# Patient Record
Sex: MALE | Race: BLACK OR AFRICAN AMERICAN | Employment: OTHER | URBAN - METROPOLITAN AREA
[De-identification: names, ages, dates, MRNs, and addresses within clinical notes are randomized per-mention and may not be internally consistent; named-entity substitution may affect disease eponyms.]

---

## 2021-04-30 ENCOUNTER — HOSPITAL ENCOUNTER (OUTPATIENT)
Age: 81
Discharge: HOME | End: 2021-04-30
Attending: INTERNAL MEDICINE | Admitting: INTERNAL MEDICINE
Payer: MEDICAID

## 2021-04-30 VITALS
HEIGHT: 72 IN | OXYGEN SATURATION: 96 % | BODY MASS INDEX: 25.73 KG/M2 | TEMPERATURE: 98.1 F | RESPIRATION RATE: 18 BRPM | SYSTOLIC BLOOD PRESSURE: 120 MMHG | WEIGHT: 190 LBS | HEART RATE: 78 BPM | DIASTOLIC BLOOD PRESSURE: 69 MMHG

## 2021-04-30 DIAGNOSIS — R94.39 ABNORMAL STRESS TEST: ICD-10-CM

## 2021-04-30 PROBLEM — R07.9 CHEST PAIN: Status: ACTIVE | Noted: 2021-04-30

## 2021-04-30 LAB
ALBUMIN SERPL-MCNC: 3.2 G/DL (ref 3.5–5)
ALBUMIN/GLOB SERPL: 0.8 {RATIO} (ref 1.1–2.2)
ALP SERPL-CCNC: 100 U/L (ref 45–117)
ALT SERPL-CCNC: 15 U/L (ref 12–78)
ANION GAP SERPL CALC-SCNC: 4 MMOL/L (ref 5–15)
APTT PPP: 36.7 SEC (ref 21.2–34.1)
AST SERPL W P-5'-P-CCNC: 10 U/L (ref 15–37)
ATRIAL RATE: 81 BPM
BILIRUB SERPL-MCNC: 1.2 MG/DL (ref 0.2–1)
BUN SERPL-MCNC: 15 MG/DL (ref 6–20)
BUN/CREAT SERPL: 17 (ref 12–20)
CA-I BLD-MCNC: 8.5 MG/DL (ref 8.5–10.1)
CALCULATED P AXIS, ECG09: 76 DEGREES
CALCULATED R AXIS, ECG10: 30 DEGREES
CALCULATED T AXIS, ECG11: 47 DEGREES
CHLORIDE SERPL-SCNC: 110 MMOL/L (ref 97–108)
CO2 SERPL-SCNC: 24 MMOL/L (ref 21–32)
CREAT SERPL-MCNC: 0.88 MG/DL (ref 0.7–1.3)
DIAGNOSIS, 93000: NORMAL
ERYTHROCYTE [DISTWIDTH] IN BLOOD BY AUTOMATED COUNT: 13.4 % (ref 11.5–14.5)
GLOBULIN SER CALC-MCNC: 4.2 G/DL (ref 2–4)
GLUCOSE SERPL-MCNC: 89 MG/DL (ref 65–100)
HCT VFR BLD AUTO: 39.5 % (ref 36.6–50.3)
HGB BLD-MCNC: 12.8 G/DL (ref 12.1–17)
INR PPP: 1.1 (ref 0.9–1.1)
MCH RBC QN AUTO: 30.6 PG (ref 26–34)
MCHC RBC AUTO-ENTMCNC: 32.4 G/DL (ref 30–36.5)
MCV RBC AUTO: 94.5 FL (ref 80–99)
P-R INTERVAL, ECG05: 184 MS
PLATELET # BLD AUTO: 252 K/UL (ref 150–400)
PMV BLD AUTO: 10.8 FL (ref 8.9–12.9)
POTASSIUM SERPL-SCNC: 3.9 MMOL/L (ref 3.5–5.1)
PROT SERPL-MCNC: 7.4 G/DL (ref 6.4–8.2)
PROTHROMBIN TIME: 13.9 SEC (ref 11.9–14.7)
Q-T INTERVAL, ECG07: 378 MS
QRS DURATION, ECG06: 82 MS
QTC CALCULATION (BEZET), ECG08: 439 MS
RBC # BLD AUTO: 4.18 M/UL (ref 4.1–5.7)
SODIUM SERPL-SCNC: 138 MMOL/L (ref 136–145)
THERAPEUTIC RANGE,PTTT: ABNORMAL SEC (ref 82–109)
VENTRICULAR RATE, ECG03: 81 BPM
WBC # BLD AUTO: 7.6 K/UL (ref 4.1–11.1)

## 2021-04-30 PROCEDURE — 74011250636 HC RX REV CODE- 250/636: Performed by: INTERNAL MEDICINE

## 2021-04-30 PROCEDURE — 85027 COMPLETE CBC AUTOMATED: CPT

## 2021-04-30 PROCEDURE — 93010 ELECTROCARDIOGRAM REPORT: CPT | Performed by: INTERNAL MEDICINE

## 2021-04-30 PROCEDURE — 74011000250 HC RX REV CODE- 250: Performed by: INTERNAL MEDICINE

## 2021-04-30 PROCEDURE — 85610 PROTHROMBIN TIME: CPT

## 2021-04-30 PROCEDURE — 77030016699 HC CATH ANGI DX INFN1 CARD -A: Performed by: INTERNAL MEDICINE

## 2021-04-30 PROCEDURE — 85730 THROMBOPLASTIN TIME PARTIAL: CPT

## 2021-04-30 PROCEDURE — 93005 ELECTROCARDIOGRAM TRACING: CPT

## 2021-04-30 PROCEDURE — 2709999900 HC NON-CHARGEABLE SUPPLY: Performed by: INTERNAL MEDICINE

## 2021-04-30 PROCEDURE — C1894 INTRO/SHEATH, NON-LASER: HCPCS | Performed by: INTERNAL MEDICINE

## 2021-04-30 PROCEDURE — 36415 COLL VENOUS BLD VENIPUNCTURE: CPT

## 2021-04-30 PROCEDURE — C1769 GUIDE WIRE: HCPCS | Performed by: INTERNAL MEDICINE

## 2021-04-30 PROCEDURE — 99152 MOD SED SAME PHYS/QHP 5/>YRS: CPT | Performed by: INTERNAL MEDICINE

## 2021-04-30 PROCEDURE — 77030029065 HC DRSG HEMO QCLOT ZMED -B: Performed by: INTERNAL MEDICINE

## 2021-04-30 PROCEDURE — 74011000636 HC RX REV CODE- 636: Performed by: INTERNAL MEDICINE

## 2021-04-30 PROCEDURE — 80053 COMPREHEN METABOLIC PANEL: CPT

## 2021-04-30 PROCEDURE — 77030003394 HC NDL ART COOK -A: Performed by: INTERNAL MEDICINE

## 2021-04-30 PROCEDURE — 76210000021 HC REC RM PH II 0.5 TO 1 HR: Performed by: INTERNAL MEDICINE

## 2021-04-30 PROCEDURE — 77030019698 HC SYR ANGI MDLON MRTM -A: Performed by: INTERNAL MEDICINE

## 2021-04-30 PROCEDURE — 77030042317 HC BND COMPR HEMSTAT -B: Performed by: INTERNAL MEDICINE

## 2021-04-30 PROCEDURE — 93458 L HRT ARTERY/VENTRICLE ANGIO: CPT | Performed by: INTERNAL MEDICINE

## 2021-04-30 PROCEDURE — 76210000016 HC OR PH I REC 1 TO 1.5 HR: Performed by: INTERNAL MEDICINE

## 2021-04-30 RX ORDER — NICARDIPINE HYDROCHLORIDE 2.5 MG/ML
INJECTION INTRAVENOUS AS NEEDED
Status: DISCONTINUED | OUTPATIENT
Start: 2021-04-30 | End: 2021-04-30 | Stop reason: HOSPADM

## 2021-04-30 RX ORDER — HEPARIN SODIUM 200 [USP'U]/100ML
INJECTION, SOLUTION INTRAVENOUS
Status: COMPLETED | OUTPATIENT
Start: 2021-04-30 | End: 2021-04-30

## 2021-04-30 RX ORDER — CETIRIZINE HCL 10 MG
TABLET ORAL
COMMUNITY

## 2021-04-30 RX ORDER — LIDOCAINE HYDROCHLORIDE 10 MG/ML
INJECTION INFILTRATION; PERINEURAL AS NEEDED
Status: DISCONTINUED | OUTPATIENT
Start: 2021-04-30 | End: 2021-04-30 | Stop reason: HOSPADM

## 2021-04-30 RX ORDER — SODIUM CHLORIDE 9 MG/ML
30 INJECTION, SOLUTION INTRAVENOUS CONTINUOUS
Status: DISCONTINUED | OUTPATIENT
Start: 2021-04-30 | End: 2021-04-30 | Stop reason: HOSPADM

## 2021-04-30 RX ORDER — SODIUM CHLORIDE 0.9 % (FLUSH) 0.9 %
5-40 SYRINGE (ML) INJECTION AS NEEDED
Status: DISCONTINUED | OUTPATIENT
Start: 2021-04-30 | End: 2021-04-30 | Stop reason: HOSPADM

## 2021-04-30 RX ORDER — ISOSORBIDE MONONITRATE 30 MG/1
30 TABLET, EXTENDED RELEASE ORAL DAILY
COMMUNITY

## 2021-04-30 RX ORDER — SODIUM CHLORIDE 0.9 % (FLUSH) 0.9 %
5-40 SYRINGE (ML) INJECTION EVERY 8 HOURS
Status: DISCONTINUED | OUTPATIENT
Start: 2021-04-30 | End: 2021-04-30 | Stop reason: HOSPADM

## 2021-04-30 RX ORDER — HYDROCODONE BITARTRATE AND ACETAMINOPHEN 5; 325 MG/1; MG/1
1 TABLET ORAL
Status: DISCONTINUED | OUTPATIENT
Start: 2021-04-30 | End: 2021-04-30 | Stop reason: HOSPADM

## 2021-04-30 RX ORDER — GABAPENTIN 600 MG/1
600 TABLET ORAL DAILY
COMMUNITY

## 2021-04-30 RX ORDER — ACETAMINOPHEN 325 MG/1
650 TABLET ORAL
Status: DISCONTINUED | OUTPATIENT
Start: 2021-04-30 | End: 2021-04-30 | Stop reason: HOSPADM

## 2021-04-30 RX ORDER — FENTANYL CITRATE 50 UG/ML
INJECTION, SOLUTION INTRAMUSCULAR; INTRAVENOUS AS NEEDED
Status: DISCONTINUED | OUTPATIENT
Start: 2021-04-30 | End: 2021-04-30 | Stop reason: HOSPADM

## 2021-04-30 RX ORDER — PANTOPRAZOLE SODIUM 40 MG/1
40 TABLET, DELAYED RELEASE ORAL DAILY
COMMUNITY

## 2021-04-30 RX ORDER — MIDAZOLAM HYDROCHLORIDE 1 MG/ML
INJECTION INTRAMUSCULAR; INTRAVENOUS AS NEEDED
Status: DISCONTINUED | OUTPATIENT
Start: 2021-04-30 | End: 2021-04-30 | Stop reason: HOSPADM

## 2021-04-30 RX ORDER — HEPARIN SODIUM 1000 [USP'U]/ML
INJECTION, SOLUTION INTRAVENOUS; SUBCUTANEOUS AS NEEDED
Status: DISCONTINUED | OUTPATIENT
Start: 2021-04-30 | End: 2021-04-30 | Stop reason: HOSPADM

## 2021-04-30 RX ORDER — TAMSULOSIN HYDROCHLORIDE 0.4 MG/1
0.4 CAPSULE ORAL DAILY
COMMUNITY

## 2021-04-30 RX ORDER — TRAMADOL HYDROCHLORIDE 50 MG/1
50 TABLET ORAL
COMMUNITY

## 2021-04-30 RX ORDER — LISINOPRIL 10 MG/1
10 TABLET ORAL DAILY
COMMUNITY

## 2021-04-30 RX ADMIN — SODIUM CHLORIDE 30 ML/HR: 9 INJECTION, SOLUTION INTRAVENOUS at 13:15

## 2021-04-30 NOTE — Clinical Note
TRANSFER - OUT REPORT:     Verbal report given to: [place]. Report consisted of patient's Situation, Background, Assessment and   Recommendations(SBAR). Opportunity for questions and clarification was provided. Patient transported with a Registered Nurse. Patient transported to: PACU.

## 2021-04-30 NOTE — PROGRESS NOTES
Pt did well post cardiac cath, dressing rt radial remained dry/intact, radial pulse 2+, pt able to move fingers, no swelling. Family member at bedside, d/c instructions given to pt and family, voices understanding.  IV d/c'd, pt home with family.

## 2021-04-30 NOTE — Clinical Note
Contrast Dose Calculator:   Patient's age: [de-identified].   Patient's sex: Male. Patient weight (kg) = 86.2. Creatinine level (mg/dL) = 0.88. Creatinine clearance (mL/min): 81.63. Contrast concentration (mg/mL) = 370. MACD = 300 mL. Max Contrast dose per Creatinine Cl calculator = 183.66 mL.

## 2021-04-30 NOTE — DISCHARGE INSTRUCTIONS
RADIAL CATHETERIZATION AND INTERVENTIONAL DISCHARGE INSTRUCTIONS       MEDICATIONS:  Take only medications ordered by your provider. ACTIVITIES:  While the wound is healing; bleeding or swelling can occur as a result of stress or strain. Carefully follow these guidelines:    · DO NOT DRIVE for 24 hours after the procedure or as instructed by your provider. · You may shower 24 hours after your procedure. No soaking the wrist for 3 days (i.e., bath tub, swimming, washing dishes). · You may return to work in (***) days. · It is essential that you DO NOT SMOKE. · Do not bend your wrist for 24 hours. · Do not lift more than 3-5 pounds with affected wrist for 1 week. SITE CARE:  · Use a clean Band-Aid® for 48 hours. · Keep site clean and dry. · Avoid lotions, ointments or powders at the wound site for I week. · Check the procedural site for any signs of infection (redness, drainage, swelling). · You may notice a small, hard lump or small bruise at the puncture site. This is normal and will clear in about 2 weeks. · If the lump increases in size; apply firm, direct pressure over the site. Notify your physician. · If there is a small amount of bleeding at the site; apply firm, direct continuous pressure over the site with your thumb against the puncture site and your finger against the back of the wrist for 10 minutes. Your nurse will review this with you. Notify your physician. · If bleeding does not stop after 10 minutes or if there is a large amount of bleeding, call for an ambulance immediately. Continue to hold pressure until help arrives. WHEN TO CALL YOUR DOCTOR:  · Angina - if your angina symptoms return; use your nitroglycerin as instructed by your provider. If you do not have nitroglycerin or if your symptoms do not completely resolve after 10 -15 minutes, call an ambulance.  Do not drive yourself to the hospital.  · Warmth, redness, drainage and/or pain at the procedural site or temperature over 101°F.  · Persistent tenderness or pain at procedural site. · Swelling, coolness, numbness and /or tingling of the fingers, hand, wrist or arm. · Lightheadedness, dizziness, fainting or rapid heart beating. · If you have any other questions or concerns, or you are experiencing a problem. FOLLOW-UP CARE:  · Follow up with your provider and /or cardiologist as recommended. · If you had a Stent implanted, carry your Stent ID card with you at all times. · Never stop taking your prescribed Brilinta (Ticagrelor),Plavix (Clopidogrel), Aspirin, Coumadin (Warfarin) or Effient (Prasugrel) without speaking with your cardiologist.     I understand and can verbalize these instructions, and have participated in the development of this discharge plan. I have read and received a copy of this plan.

## 2022-03-07 ENCOUNTER — TRANSCRIBE ORDER (OUTPATIENT)
Dept: SCHEDULING | Age: 82
End: 2022-03-07

## 2022-03-07 DIAGNOSIS — R42 DIZZINESS: Primary | ICD-10-CM

## 2022-03-15 ENCOUNTER — HOSPITAL ENCOUNTER (OUTPATIENT)
Dept: CT IMAGING | Age: 82
Discharge: HOME | End: 2022-03-15
Attending: NURSE PRACTITIONER
Payer: MEDICAID

## 2022-03-15 DIAGNOSIS — R42 DIZZINESS: ICD-10-CM

## 2022-03-15 PROCEDURE — 70450 CT HEAD/BRAIN W/O DYE: CPT

## 2022-03-19 PROBLEM — R07.9 CHEST PAIN: Status: ACTIVE | Noted: 2021-04-30

## 2022-04-19 ENCOUNTER — APPOINTMENT (OUTPATIENT)
Dept: GENERAL RADIOLOGY | Age: 82
End: 2022-04-19
Attending: EMERGENCY MEDICINE
Payer: MEDICAID

## 2022-04-19 ENCOUNTER — HOSPITAL ENCOUNTER (OUTPATIENT)
Age: 82
Setting detail: OBSERVATION
Discharge: HOME | End: 2022-04-20
Attending: EMERGENCY MEDICINE | Admitting: HOSPITALIST
Payer: MEDICAID

## 2022-04-19 ENCOUNTER — APPOINTMENT (OUTPATIENT)
Dept: CT IMAGING | Age: 82
End: 2022-04-19
Attending: HOSPITALIST
Payer: MEDICAID

## 2022-04-19 DIAGNOSIS — R07.9 ACUTE CHEST PAIN: Primary | ICD-10-CM

## 2022-04-19 PROBLEM — K21.9 GERD (GASTROESOPHAGEAL REFLUX DISEASE): Status: ACTIVE | Noted: 2022-04-19

## 2022-04-19 PROBLEM — I25.10 CAD (CORONARY ARTERY DISEASE): Status: ACTIVE | Noted: 2022-04-19

## 2022-04-19 PROBLEM — R79.89 POSITIVE D DIMER: Status: ACTIVE | Noted: 2022-04-19

## 2022-04-19 PROBLEM — I10 HTN (HYPERTENSION): Status: ACTIVE | Noted: 2022-04-19

## 2022-04-19 LAB
ALBUMIN SERPL-MCNC: 3.1 G/DL (ref 3.5–5)
ALBUMIN/GLOB SERPL: 0.8 {RATIO} (ref 1.1–2.2)
ALP SERPL-CCNC: 95 U/L (ref 45–117)
ALT SERPL-CCNC: 14 U/L (ref 12–78)
ANION GAP SERPL CALC-SCNC: 8 MMOL/L (ref 5–15)
AST SERPL W P-5'-P-CCNC: 12 U/L (ref 15–37)
BASOPHILS # BLD: 0.1 K/UL (ref 0–0.2)
BASOPHILS NFR BLD: 1 % (ref 0–2.5)
BILIRUB SERPL-MCNC: 0.3 MG/DL (ref 0.2–1)
BNP SERPL-MCNC: 53 PG/ML
BUN SERPL-MCNC: 6 MG/DL (ref 6–20)
BUN/CREAT SERPL: 6 (ref 12–20)
CA-I BLD-MCNC: 8 MG/DL (ref 8.5–10.1)
CHLORIDE SERPL-SCNC: 107 MMOL/L (ref 97–108)
CHOLEST SERPL-MCNC: 140 MG/DL
CO2 SERPL-SCNC: 28 MMOL/L (ref 21–32)
CREAT SERPL-MCNC: 1.05 MG/DL (ref 0.7–1.3)
D DIMER PPP FEU-MCNC: 1.31 UG/ML(FEU)
EOSINOPHIL # BLD: 0.2 K/UL (ref 0–0.7)
EOSINOPHIL NFR BLD: 4 % (ref 0.9–2.9)
ERYTHROCYTE [DISTWIDTH] IN BLOOD BY AUTOMATED COUNT: 13.8 % (ref 11.5–14.5)
GLOBULIN SER CALC-MCNC: 3.9 G/DL (ref 2–4)
GLUCOSE SERPL-MCNC: 89 MG/DL (ref 65–100)
HCT VFR BLD AUTO: 38.2 % (ref 41–53)
HDLC SERPL-MCNC: 41 MG/DL
HDLC SERPL: 3.4 {RATIO} (ref 0–5)
HGB BLD-MCNC: 12.6 G/DL (ref 13.5–17.5)
INR PPP: 1.1 (ref 0.9–1.1)
LDLC SERPL CALC-MCNC: 56.8 MG/DL (ref 0–100)
LIPID PROFILE,FLP: ABNORMAL
LYMPHOCYTES # BLD: 1.8 K/UL (ref 1–4.8)
LYMPHOCYTES NFR BLD: 29 % (ref 20.5–51.1)
MAGNESIUM SERPL-MCNC: 1.9 MG/DL (ref 1.6–2.4)
MCH RBC QN AUTO: 31 PG (ref 31–34)
MCHC RBC AUTO-ENTMCNC: 33 G/DL (ref 31–36)
MCV RBC AUTO: 93.8 FL (ref 80–100)
MONOCYTES # BLD: 0.4 K/UL (ref 0.2–2.4)
MONOCYTES NFR BLD: 6 % (ref 1.7–9.3)
NEUTS SEG # BLD: 3.8 K/UL (ref 1.8–7.7)
NEUTS SEG NFR BLD: 60 % (ref 42–75)
NRBC # BLD: 0.01 K/UL
NRBC BLD-RTO: 0.1 PER 100 WBC
PLATELET # BLD AUTO: 276 K/UL (ref 150–400)
PMV BLD AUTO: 8.5 FL (ref 6.5–11.5)
POTASSIUM SERPL-SCNC: 4.2 MMOL/L (ref 3.5–5.1)
PROT SERPL-MCNC: 7 G/DL (ref 6.4–8.2)
PROTHROMBIN TIME: 14 SEC (ref 11.9–14.6)
RBC # BLD AUTO: 4.07 M/UL (ref 4.5–5.9)
SODIUM SERPL-SCNC: 143 MMOL/L (ref 136–145)
TRIGL SERPL-MCNC: 211 MG/DL (ref ?–150)
TROPONIN-HIGH SENSITIVITY: 6 NG/L (ref 0–76)
TROPONIN-HIGH SENSITIVITY: 6 NG/L (ref 0–76)
TROPONIN-HIGH SENSITIVITY: 7 NG/L (ref 0–76)
TSH SERPL DL<=0.05 MIU/L-ACNC: 1.07 UIU/ML (ref 0.36–3.74)
VLDLC SERPL CALC-MCNC: 42.2 MG/DL
WBC # BLD AUTO: 6.3 K/UL (ref 4.4–11.3)

## 2022-04-19 PROCEDURE — G0378 HOSPITAL OBSERVATION PER HR: HCPCS

## 2022-04-19 PROCEDURE — 71045 X-RAY EXAM CHEST 1 VIEW: CPT

## 2022-04-19 PROCEDURE — 83880 ASSAY OF NATRIURETIC PEPTIDE: CPT

## 2022-04-19 PROCEDURE — 74011000250 HC RX REV CODE- 250: Performed by: HOSPITALIST

## 2022-04-19 PROCEDURE — 71275 CT ANGIOGRAPHY CHEST: CPT

## 2022-04-19 PROCEDURE — 74011250637 HC RX REV CODE- 250/637: Performed by: HOSPITALIST

## 2022-04-19 PROCEDURE — 36415 COLL VENOUS BLD VENIPUNCTURE: CPT

## 2022-04-19 PROCEDURE — 74011000636 HC RX REV CODE- 636: Performed by: HOSPITALIST

## 2022-04-19 PROCEDURE — 83735 ASSAY OF MAGNESIUM: CPT

## 2022-04-19 PROCEDURE — 93005 ELECTROCARDIOGRAM TRACING: CPT

## 2022-04-19 PROCEDURE — 80053 COMPREHEN METABOLIC PANEL: CPT

## 2022-04-19 PROCEDURE — 74011250637 HC RX REV CODE- 250/637: Performed by: EMERGENCY MEDICINE

## 2022-04-19 PROCEDURE — 85025 COMPLETE CBC W/AUTO DIFF WBC: CPT

## 2022-04-19 PROCEDURE — 85379 FIBRIN DEGRADATION QUANT: CPT

## 2022-04-19 PROCEDURE — 99285 EMERGENCY DEPT VISIT HI MDM: CPT

## 2022-04-19 PROCEDURE — 84443 ASSAY THYROID STIM HORMONE: CPT

## 2022-04-19 PROCEDURE — 80061 LIPID PANEL: CPT

## 2022-04-19 PROCEDURE — 85610 PROTHROMBIN TIME: CPT

## 2022-04-19 PROCEDURE — 84484 ASSAY OF TROPONIN QUANT: CPT

## 2022-04-19 RX ORDER — ONDANSETRON 2 MG/ML
4 INJECTION INTRAMUSCULAR; INTRAVENOUS
Status: DISCONTINUED | OUTPATIENT
Start: 2022-04-19 | End: 2022-04-20 | Stop reason: HOSPADM

## 2022-04-19 RX ORDER — ACETAMINOPHEN 325 MG/1
650 TABLET ORAL
Status: DISCONTINUED | OUTPATIENT
Start: 2022-04-19 | End: 2022-04-20 | Stop reason: HOSPADM

## 2022-04-19 RX ORDER — SODIUM CHLORIDE 0.9 % (FLUSH) 0.9 %
5-40 SYRINGE (ML) INJECTION EVERY 8 HOURS
Status: DISCONTINUED | OUTPATIENT
Start: 2022-04-19 | End: 2022-04-20 | Stop reason: HOSPADM

## 2022-04-19 RX ORDER — PANTOPRAZOLE SODIUM 40 MG/1
40 TABLET, DELAYED RELEASE ORAL
Status: DISCONTINUED | OUTPATIENT
Start: 2022-04-20 | End: 2022-04-20 | Stop reason: HOSPADM

## 2022-04-19 RX ORDER — CETIRIZINE HCL 10 MG
10 TABLET ORAL EVERY EVENING
Status: DISCONTINUED | OUTPATIENT
Start: 2022-04-19 | End: 2022-04-20 | Stop reason: HOSPADM

## 2022-04-19 RX ORDER — ISOSORBIDE MONONITRATE 30 MG/1
30 TABLET, EXTENDED RELEASE ORAL DAILY
Status: DISCONTINUED | OUTPATIENT
Start: 2022-04-20 | End: 2022-04-20 | Stop reason: HOSPADM

## 2022-04-19 RX ORDER — SODIUM CHLORIDE 0.9 % (FLUSH) 0.9 %
5-40 SYRINGE (ML) INJECTION AS NEEDED
Status: DISCONTINUED | OUTPATIENT
Start: 2022-04-19 | End: 2022-04-20 | Stop reason: HOSPADM

## 2022-04-19 RX ORDER — LISINOPRIL 10 MG/1
10 TABLET ORAL DAILY
Status: DISCONTINUED | OUTPATIENT
Start: 2022-04-20 | End: 2022-04-20 | Stop reason: HOSPADM

## 2022-04-19 RX ORDER — ENOXAPARIN SODIUM 100 MG/ML
40 INJECTION SUBCUTANEOUS DAILY
Status: DISCONTINUED | OUTPATIENT
Start: 2022-04-20 | End: 2022-04-20 | Stop reason: HOSPADM

## 2022-04-19 RX ORDER — ACETAMINOPHEN 650 MG/1
650 SUPPOSITORY RECTAL
Status: DISCONTINUED | OUTPATIENT
Start: 2022-04-19 | End: 2022-04-20 | Stop reason: HOSPADM

## 2022-04-19 RX ORDER — GUAIFENESIN 100 MG/5ML
324 LIQUID (ML) ORAL
Status: COMPLETED | OUTPATIENT
Start: 2022-04-19 | End: 2022-04-19

## 2022-04-19 RX ORDER — POLYETHYLENE GLYCOL 3350 17 G/17G
17 POWDER, FOR SOLUTION ORAL DAILY PRN
Status: DISCONTINUED | OUTPATIENT
Start: 2022-04-19 | End: 2022-04-20 | Stop reason: HOSPADM

## 2022-04-19 RX ORDER — TAMSULOSIN HYDROCHLORIDE 0.4 MG/1
0.4 CAPSULE ORAL DAILY
Status: DISCONTINUED | OUTPATIENT
Start: 2022-04-20 | End: 2022-04-20 | Stop reason: HOSPADM

## 2022-04-19 RX ORDER — TRAMADOL HYDROCHLORIDE 50 MG/1
50 TABLET ORAL
Status: DISCONTINUED | OUTPATIENT
Start: 2022-04-19 | End: 2022-04-20 | Stop reason: HOSPADM

## 2022-04-19 RX ADMIN — SODIUM CHLORIDE, PRESERVATIVE FREE 10 ML: 5 INJECTION INTRAVENOUS at 16:13

## 2022-04-19 RX ADMIN — CETIRIZINE HYDROCHLORIDE 10 MG: 10 TABLET, FILM COATED ORAL at 18:32

## 2022-04-19 RX ADMIN — IOPAMIDOL 100 ML: 755 INJECTION, SOLUTION INTRAVENOUS at 16:07

## 2022-04-19 RX ADMIN — ASPIRIN 81 MG 324 MG: 81 TABLET ORAL at 11:35

## 2022-04-19 RX ADMIN — SODIUM CHLORIDE, PRESERVATIVE FREE 10 ML: 5 INJECTION INTRAVENOUS at 21:43

## 2022-04-19 NOTE — ROUTINE PROCESS
Patient admitted to PCU room #214, A&O x3, [place] bilaterally, NSR on telemetry, RA and tolerating well, no c/o CP since admission, cane at bedside, VSS.

## 2022-04-19 NOTE — ED NOTES
TRANSFER - OUT REPORT:    Verbal report given to [facility] RN on [place]  being transferred to [facility] for routine progression of care       Report consisted of patients Situation, Background, Assessment and   Recommendations(SBAR). Information from the following report(s) SBAR, ED Summary, MAR, Recent Results and Cardiac Rhythm NSR was reviewed with the receiving nurse. Lines:   Peripheral IV 04/19/22 Left Antecubital (Active)   Site Assessment Clean, dry, & intact 04/19/22 1155   Phlebitis Assessment 0 04/19/22 1155   Infiltration Assessment 0 04/19/22 1155   Dressing Status Clean, dry, & intact 04/19/22 1155   Dressing Type Tape;Transparent 04/19/22 1155   Hub Color/Line Status Pink;Flushed;Patent 04/19/22 1155        Opportunity for questions and clarification was provided.       Patient transported with:   Registered Nurse

## 2022-04-19 NOTE — ED PROVIDER NOTES
EMERGENCY DEPARTMENT HISTORY AND PHYSICAL EXAM      Date: 4/19/2022  Patient Name: [facility]      History of Presenting Illness     Chief Complaint   Patient presents with    Chest Pain       History Provided By: Patient    HPI: [facility], 81 y.o. male with a past medical history significant hypertension and stroke presents to the ED with cc of chest pain started yesterday with associated shortness of breath     There are no other complaints, changes, or physical findings at this time. PCP: Alexy Humphries MD    Current Outpatient Medications   Medication Sig Dispense Refill    gabapentin (NEURONTIN) 600 mg tablet Take 600 mg by mouth daily.  lisinopriL (PRINIVIL, ZESTRIL) 10 mg tablet Take 10 mg by mouth daily.  tamsulosin (FLOMAX) 0.4 mg capsule Take 0.4 mg by mouth daily.  traMADoL (ULTRAM) 50 mg tablet Take 50 mg by mouth every six (6) hours as needed for Pain.  pantoprazole (PROTONIX) 40 mg tablet Take 40 mg by mouth daily.  cetirizine (ZYRTEC) 10 mg tablet Take  by mouth nightly.  isosorbide mononitrate ER (IMDUR) 30 mg tablet Take 30 mg by mouth daily. Past History     Past Medical History:  Past Medical History:   Diagnosis Date    GERD (gastroesophageal reflux disease)     Hypertension     Stroke [facility])        Past Surgical History:  No past surgical history on file. Family History:  Family History   Family history unknown: Yes       Social History:  Social History     Tobacco Use    Smoking status: Never Smoker    Smokeless tobacco: Never Used   Substance Use Topics    Alcohol use: Never    Drug use: Never       Allergies:  No Known Allergies      Review of Systems     Review of Systems   Constitutional: Negative for chills and fever. HENT: Negative for rhinorrhea and sore throat. Eyes: Negative for pain and visual disturbance. Respiratory: Positive for shortness of breath. Negative for cough.     Cardiovascular: Positive for chest pain. Negative for leg swelling. Gastrointestinal: Negative for abdominal pain and vomiting. Endocrine: Negative for polydipsia and polyuria. Genitourinary: Negative for dysuria and hematuria. Musculoskeletal: Negative for back pain and neck pain. Skin: Negative for color change and pallor. Neurological: Negative for weakness and headaches. Psychiatric/Behavioral: Negative for agitation and suicidal ideas. Physical Exam     Physical Exam  Vitals and nursing note reviewed. Constitutional:       General: He is not in acute distress. Appearance: He is not ill-appearing, toxic-appearing or diaphoretic. HENT:      Head: Normocephalic and atraumatic. Right Ear: Tympanic membrane normal.      Left Ear: Tympanic membrane normal.      Nose: Nose normal. No congestion. Mouth/Throat:      Mouth: Mucous membranes are moist.      Pharynx: Oropharynx is clear. Eyes:      Extraocular Movements: Extraocular movements intact. Conjunctiva/sclera: Conjunctivae normal.      Pupils: Pupils are equal, round, and reactive to light. Cardiovascular:      Rate and Rhythm: Normal rate and regular rhythm. Pulses: Normal pulses. Heart sounds: Normal heart sounds. Pulmonary:      Effort: Pulmonary effort is normal.      Breath sounds: Normal breath sounds. Abdominal:      General: Bowel sounds are normal.      Palpations: Abdomen is soft. Tenderness: There is no abdominal tenderness. Musculoskeletal:         General: No tenderness, deformity or signs of injury. Normal range of motion. Cervical back: Normal range of motion and neck supple. No rigidity or tenderness. Lymphadenopathy:      Cervical: No cervical adenopathy. Skin:     General: Skin is warm and dry. Capillary Refill: Capillary refill takes less than 2 seconds. Findings: No rash. Neurological:      General: No focal deficit present.       Mental Status: He is alert and oriented to person, place, and time.      Cranial Nerves: No cranial nerve deficit. Sensory: No sensory deficit. Psychiatric:         Mood and Affect: Mood normal.         Behavior: Behavior normal.         Lab and Diagnostic Study Results     Labs -     Recent Results (from the past 12 hour(s))   EKG, 12 LEAD, INITIAL    Collection Time: 04/19/22 11:04 AM   Result Value Ref Range    Ventricular Rate 74 BPM    Atrial Rate 74 BPM    P-R Interval 182 ms    QRS Duration 87 ms    Q-T Interval 387 ms    QTC Calculation (Bezet) 430 ms    Calculated P Axis 64 degrees    Calculated R Axis 39 degrees    Calculated T Axis 61 degrees    Diagnosis Sinus rhythm        Radiologic Studies -   [unfilled]  CT Results  (Last 48 hours)    None        CXR Results  (Last 48 hours)    None          Medical Decision Making and ED Course   - I am the first and primary provider for this patient AND AM THE PRIMARY PROVIDER OF RECORD. - I reviewed the vital signs, available nursing notes, past medical history, past surgical history, family history and social history. - Initial assessment performed. The patients presenting problems have been discussed, and the staff are in agreement with the care plan formulated and outlined with them. I have encouraged them to ask questions as they arise throughout their visit. Vital Signs-Reviewed the patient's vital signs. Patient Vitals for the past 12 hrs:   Temp Pulse Resp BP SpO2   04/19/22 1112     100 %   04/19/22 1109 98.4 °F (36.9 °C) 73 18 135/78 99 %       Records Reviewed: Nursing Notes    The patient presents with chest pain with a differential diagnosis of  ACS, pulmonary edema/CHF and pnuemonia    ED Course:       ED Course as of 04/19/22 1405   Tue Apr 19, 2022   1120 Patient pain-free now [SB]      ED Course User Index  [SB] Luther Gold MD         Provider Notes (Medical Decision Making):      MDM           Consultations:       Consultations: - NONE        Procedures and Critical Care       Performed by: Luana Ceballos MD  PROCEDURES:  Procedures               Luana Ceballos MD        Disposition     Disposition: Condition stable and improved  Admitted, patient stable, observation unit, case discussed with Dr. Alberto Garcia if not discharged  DISCHARGE PLAN:  1. Current Discharge Medication List      CONTINUE these medications which have NOT CHANGED    Details   gabapentin (NEURONTIN) 600 mg tablet Take 600 mg by mouth daily. lisinopriL (PRINIVIL, ZESTRIL) 10 mg tablet Take 10 mg by mouth daily. tamsulosin (FLOMAX) 0.4 mg capsule Take 0.4 mg by mouth daily. traMADoL (ULTRAM) 50 mg tablet Take 50 mg by mouth every six (6) hours as needed for Pain.      pantoprazole (PROTONIX) 40 mg tablet Take 40 mg by mouth daily. cetirizine (ZYRTEC) 10 mg tablet Take  by mouth nightly. isosorbide mononitrate ER (IMDUR) 30 mg tablet Take 30 mg by mouth daily. 2.   Follow-up Information    None       3. Return to ED if worse   4. Current Discharge Medication List          Diagnosis     Clinical Impression: No diagnosis found. Attestations:    Luana Ceballos MD    Please note that this dictation was completed with [employer], the computer voice recognition software. Quite often unanticipated grammatical, syntax, homophones, and other interpretive errors are inadvertently transcribed by the computer software. Please disregard these errors. Please excuse any errors that have escaped final proofreading. Thank you.

## 2022-04-19 NOTE — ED TRIAGE NOTES
Patient reports upper chest pain on both sides that started yesterday, also reports slight SOB as well. Denies hx of MI or any cardiac hx.

## 2022-04-19 NOTE — ED NOTES
Attempted to call report at this time, states nurse is in another room at this time but she would give me a call back shortly.

## 2022-04-19 NOTE — ED NOTES
Attempted to give report for a second time, states that nurse is not at the desk & they would get her to call the ER.

## 2022-04-20 ENCOUNTER — APPOINTMENT (OUTPATIENT)
Dept: VASCULAR SURGERY | Age: 82
End: 2022-04-20
Attending: NURSE PRACTITIONER
Payer: MEDICAID

## 2022-04-20 VITALS
DIASTOLIC BLOOD PRESSURE: 70 MMHG | BODY MASS INDEX: 26.61 KG/M2 | OXYGEN SATURATION: 94 % | SYSTOLIC BLOOD PRESSURE: 125 MMHG | TEMPERATURE: 98.2 F | WEIGHT: 196.5 LBS | RESPIRATION RATE: 18 BRPM | HEART RATE: 68 BPM | HEIGHT: 72 IN

## 2022-04-20 LAB
ANION GAP SERPL CALC-SCNC: 8 MMOL/L (ref 5–15)
ATRIAL RATE: 70 BPM
ATRIAL RATE: 74 BPM
BASOPHILS # BLD: 0 K/UL (ref 0–0.2)
BASOPHILS NFR BLD: 1 % (ref 0–2.5)
BUN SERPL-MCNC: 7 MG/DL (ref 6–20)
BUN/CREAT SERPL: 7 (ref 12–20)
CA-I BLD-MCNC: 7.9 MG/DL (ref 8.5–10.1)
CALCULATED P AXIS, ECG09: 62 DEGREES
CALCULATED P AXIS, ECG09: 64 DEGREES
CALCULATED R AXIS, ECG10: 25 DEGREES
CALCULATED R AXIS, ECG10: 39 DEGREES
CALCULATED T AXIS, ECG11: 37 DEGREES
CALCULATED T AXIS, ECG11: 61 DEGREES
CHLORIDE SERPL-SCNC: 106 MMOL/L (ref 97–108)
CO2 SERPL-SCNC: 26 MMOL/L (ref 21–32)
CREAT SERPL-MCNC: 1.05 MG/DL (ref 0.7–1.3)
DIAGNOSIS, 93000: NORMAL
DIAGNOSIS, 93000: NORMAL
ECHO AO ROOT DIAM: 4.2 CM
ECHO AO ROOT INDEX: 1.99 CM/M2
ECHO AV MEAN GRADIENT: 3 MMHG
ECHO AV PEAK VELOCITY: 1.2 M/S
ECHO AV VTI: 24.2 CM
ECHO LA VOL 2C: 21 ML (ref 18–58)
ECHO LA VOL 2C: 29 ML (ref 18–58)
ECHO LV EDV A2C: 33 ML
ECHO LV EDV A4C: 52 ML
ECHO LV EDV INDEX A4C: 25 ML/M2
ECHO LV EDV NDEX A2C: 16 ML/M2
ECHO LV EJECTION FRACTION BIPLANE: 70 % (ref 55–100)
ECHO LV ESV A2C: 12 ML
ECHO LV ESV A4C: 13 ML
ECHO LV ESV INDEX A2C: 6 ML/M2
ECHO LV ESV INDEX A4C: 6 ML/M2
ECHO LV FRACTIONAL SHORTENING: 33 % (ref 28–44)
ECHO LV INTERNAL DIMENSION DIASTOLE INDEX: 2.18 CM/M2
ECHO LV INTERNAL DIMENSION DIASTOLIC: 4.6 CM (ref 4.2–5.9)
ECHO LV INTERNAL DIMENSION SYSTOLIC INDEX: 1.47 CM/M2
ECHO LV INTERNAL DIMENSION SYSTOLIC: 3.1 CM
ECHO LV IVSD: 1 CM (ref 0.6–1)
ECHO LV MASS 2D: 169.9 G (ref 88–224)
ECHO LV MASS INDEX 2D: 80.5 G/M2 (ref 49–115)
ECHO LV POSTERIOR WALL DIASTOLIC: 1.1 CM (ref 0.6–1)
ECHO LV RELATIVE WALL THICKNESS RATIO: 0.48
ECHO LVOT AV VTI INDEX: 0.68
ECHO LVOT MEAN GRADIENT: 1 MMHG
ECHO LVOT PEAK GRADIENT: 4 MMHG
ECHO LVOT VTI: 16.4 CM
ECHO MV A VELOCITY: 0.85 M/S
ECHO MV AREA PHT: 3.1 CM2
ECHO MV E DECELERATION TIME (DT): 241.2 MS
ECHO MV E VELOCITY: 0.8 M/S
ECHO MV E/A RATIO: 0.94
ECHO MV PRESSURE HALF TIME (PHT): 70 MS
ECHO RV INTERNAL DIMENSION: 3 CM
ECHO RV TAPSE: 2.3 CM (ref 1.7–?)
ECHO TRICUSPID ANNULAR PEAK SYSTOLIC VELOCITY: 13 CM/S
EOSINOPHIL # BLD: 0.2 K/UL (ref 0–0.7)
EOSINOPHIL NFR BLD: 4 % (ref 0.9–2.9)
ERYTHROCYTE [DISTWIDTH] IN BLOOD BY AUTOMATED COUNT: 14 % (ref 11.5–14.5)
GLUCOSE SERPL-MCNC: 86 MG/DL (ref 65–100)
HCT VFR BLD AUTO: 37.5 % (ref 41–53)
HGB BLD-MCNC: 12.2 G/DL (ref 13.5–17.5)
LYMPHOCYTES # BLD: 1.7 K/UL (ref 1–4.8)
LYMPHOCYTES NFR BLD: 29 % (ref 20.5–51.1)
MAGNESIUM SERPL-MCNC: 1.9 MG/DL (ref 1.6–2.4)
MCH RBC QN AUTO: 30.4 PG (ref 31–34)
MCHC RBC AUTO-ENTMCNC: 32.5 G/DL (ref 31–36)
MCV RBC AUTO: 93.4 FL (ref 80–100)
MONOCYTES # BLD: 0.5 K/UL (ref 0.2–2.4)
MONOCYTES NFR BLD: 9 % (ref 1.7–9.3)
NEUTS SEG # BLD: 3.4 K/UL (ref 1.8–7.7)
NEUTS SEG NFR BLD: 57 % (ref 42–75)
NRBC # BLD: 0 K/UL
NRBC BLD-RTO: 0.1 PER 100 WBC
P-R INTERVAL, ECG05: 182 MS
P-R INTERVAL, ECG05: 193 MS
PLATELET # BLD AUTO: 258 K/UL (ref 150–400)
PMV BLD AUTO: 8.5 FL (ref 6.5–11.5)
POTASSIUM SERPL-SCNC: 4 MMOL/L (ref 3.5–5.1)
Q-T INTERVAL, ECG07: 387 MS
Q-T INTERVAL, ECG07: 411 MS
QRS DURATION, ECG06: 81 MS
QRS DURATION, ECG06: 87 MS
QTC CALCULATION (BEZET), ECG08: 430 MS
QTC CALCULATION (BEZET), ECG08: 444 MS
RBC # BLD AUTO: 4.01 M/UL (ref 4.5–5.9)
SODIUM SERPL-SCNC: 140 MMOL/L (ref 136–145)
VENTRICULAR RATE, ECG03: 70 BPM
VENTRICULAR RATE, ECG03: 74 BPM
WBC # BLD AUTO: 5.9 K/UL (ref 4.4–11.3)

## 2022-04-20 PROCEDURE — 36415 COLL VENOUS BLD VENIPUNCTURE: CPT

## 2022-04-20 PROCEDURE — 85025 COMPLETE CBC W/AUTO DIFF WBC: CPT

## 2022-04-20 PROCEDURE — 83735 ASSAY OF MAGNESIUM: CPT

## 2022-04-20 PROCEDURE — 74011250637 HC RX REV CODE- 250/637: Performed by: HOSPITALIST

## 2022-04-20 PROCEDURE — 80048 BASIC METABOLIC PNL TOTAL CA: CPT

## 2022-04-20 PROCEDURE — 96374 THER/PROPH/DIAG INJ IV PUSH: CPT

## 2022-04-20 PROCEDURE — 93306 TTE W/DOPPLER COMPLETE: CPT

## 2022-04-20 PROCEDURE — 74011250636 HC RX REV CODE- 250/636: Performed by: NURSE PRACTITIONER

## 2022-04-20 PROCEDURE — G0378 HOSPITAL OBSERVATION PER HR: HCPCS

## 2022-04-20 PROCEDURE — 74011000250 HC RX REV CODE- 250: Performed by: HOSPITALIST

## 2022-04-20 RX ORDER — MAGNESIUM SULFATE HEPTAHYDRATE 40 MG/ML
2 INJECTION, SOLUTION INTRAVENOUS ONCE
Status: COMPLETED | OUTPATIENT
Start: 2022-04-20 | End: 2022-04-20

## 2022-04-20 RX ADMIN — LISINOPRIL 10 MG: 10 TABLET ORAL at 10:32

## 2022-04-20 RX ADMIN — ISOSORBIDE MONONITRATE 30 MG: 30 TABLET, EXTENDED RELEASE ORAL at 10:32

## 2022-04-20 RX ADMIN — SODIUM CHLORIDE, PRESERVATIVE FREE 10 ML: 5 INJECTION INTRAVENOUS at 05:53

## 2022-04-20 RX ADMIN — TAMSULOSIN HYDROCHLORIDE 0.4 MG: 0.4 CAPSULE ORAL at 10:32

## 2022-04-20 RX ADMIN — PANTOPRAZOLE SODIUM 40 MG: 40 TABLET, DELAYED RELEASE ORAL at 10:32

## 2022-04-20 RX ADMIN — MAGNESIUM SULFATE HEPTAHYDRATE 2 G: 40 INJECTION, SOLUTION INTRAVENOUS at 10:32

## 2022-04-20 NOTE — CONSULTS
CONSULTATION    REASON FOR CONSULT:  Chest Pain    REQUESTING PROVIDER:  Dr. Franchesca Rios:    Chief Complaint   Patient presents with    Chest Pain         HISTORY OF PRESENT ILLNESS:  Rosangela Posey is a 81y.o. year-old male with past medical history significant for GERD, HTN, CVA, Nonobstructive CAD (Cath 4/30/21) last seen in clinic by [facility] 5/21/21 who presented to ED for evaluation of chest pain. He stated that 4/18 he was driving and had his discomfort in the lower part of his chest with no radiation or associated symptoms and spontaneously resolved after 15 minutes. Had 2 further similar episodes overnight/early 4/19 am, so he presented to ED for evaluation. Workup in ED revealed Dimer 1.31, but CTA negative for PE, BNP 53, HS Trop  6->7, TSH 1.07 and HR/BP/SPO2 were all WNL. He was referred for admission for further monitoring and trending given risk factors. He did report that over the past 1 has been frequently climbing a ladder onto a roof and doing some lifting to help his grandson. This am he has no complaints and reports that he is feeling well. No sx overnight either. Records from hospital admission course thus far reviewed. Telemetry Review: SR      PAST MEDICAL HISTORY:    Past Medical History:   Diagnosis Date    GERD (gastroesophageal reflux disease)     Hypertension     Stroke ([facility] [address]        PAST SURGICAL HISTORY: No past surgical history on file. ALLERGIES:  No Known Allergies    FAMILY HISTORY:    Family History   Family history unknown: Yes       SOCIAL HISTORY:    Social History     Tobacco Use    Smoking status: Never Smoker    Smokeless tobacco: Never Used   Substance Use Topics    Alcohol use: Never    Drug use: Never         HOME MEDICATIONS:    Prior to Admission Medications   Prescriptions Last Dose Informant Patient Reported? Taking?    cetirizine (ZYRTEC) 10 mg tablet   Yes No   Sig: Take  by mouth nightly.   gabapentin (NEURONTIN) 600 mg tablet   Yes No   Sig: Take 600 mg by mouth daily. isosorbide mononitrate ER (IMDUR) 30 mg tablet   Yes No   Sig: Take 30 mg by mouth daily. lisinopriL (PRINIVIL, ZESTRIL) 10 mg tablet   Yes No   Sig: Take 10 mg by mouth daily. pantoprazole (PROTONIX) 40 mg tablet   Yes No   Sig: Take 40 mg by mouth daily. tamsulosin (FLOMAX) 0.4 mg capsule   Yes No   Sig: Take 0.4 mg by mouth daily. traMADoL (ULTRAM) 50 mg tablet   Yes No   Sig: Take 50 mg by mouth every six (6) hours as needed for Pain. Facility-Administered Medications: None       REVIEW OF SYSTEMS:  Complete review of systems performed, pertinents noted above, all other systems are negative. Patient Vitals for the past 24 hrs:   Temp Pulse Resp BP SpO2   04/20/22 0744 98.2 °F (36.8 °C) 68 18 125/70 94 %   04/20/22 0400  71      04/20/22 0327 99.1 °F (37.3 °C) 68 18 110/66 95 %   04/20/22 0000  66      04/19/22 2316 98.4 °F (36.9 °C) 64 18 135/67 95 %   04/19/22 2000  65      04/19/22 1932 97.7 °F (36.5 °C) 67 18 131/76 95 %   04/19/22 1610 97.5 °F (36.4 °C) 64 18 (!) 149/85 95 %   04/19/22 1600  64      04/19/22 1500  72 16 (!) 147/74 98 %   04/19/22 1400  70 18 128/76 98 %   04/19/22 1300  69 16 125/77 98 %   04/19/22 1230  69 24 129/77 99 %   04/19/22 1200  70 16 128/73 99 %   04/19/22 1112     100 %   04/19/22 1109 98.4 °F (36.9 °C) 73 18 135/78 99 %       PHYSICAL EXAMINATION:    General: Well nourished male lying inb ed, NAD, A&O  HEENT: Normocephalic, PERRL, no drainage, extremely hard of hearing  Neck: Supple, Trachea midline, No JVD  RESP: CTA bilaterally. + Symmetrical chest movement. No SOB or distress. On RA  Cardiovascular: RRR no MRG  PVS: No rubor, cyanosis, no edema, Radial, DP, PT pulses equal bilaterally  ABD: obese, soft, NT, Normoactive BS  Derm: Warm/Dry/Intact with no lesions, normal turgor  Neuro: A&O PPTS, cranial nerves II- XII grossly intact via interaction with patient.  No focal deficits  PSYCH: No anxiety or agitation      Electrocardiogram performed earlier reviewed, it shows Sr, no acute ischemia    Recent labs results and imaging reviewed. Recent Results (from the past 24 hour(s))   EKG, 12 LEAD, INITIAL    Collection Time: 04/19/22 11:04 AM   Result Value Ref Range    Ventricular Rate 74 BPM    Atrial Rate 74 BPM    P-R Interval 182 ms    QRS Duration 87 ms    Q-T Interval 387 ms    QTC Calculation (Bezet) 430 ms    Calculated P Axis 64 degrees    Calculated R Axis 39 degrees    Calculated T Axis 61 degrees    Diagnosis       Sinus rhythm    Confirmed by Dash Segura M.D., Katy Rios ([id number]) on 4/20/2022 10:02:29 AM     TROPONIN-HIGH SENSITIVITY    Collection Time: 04/19/22 11:30 AM   Result Value Ref Range    Troponin-High Sensitivity 6 0 - 76 ng/L   CBC WITH AUTOMATED DIFF    Collection Time: 04/19/22 11:34 AM   Result Value Ref Range    WBC 6.3 4.4 - 11.3 K/uL    RBC 4.07 (L) 4.50 - 5.90 M/uL    HGB 12.6 (L) 13.5 - 17.5 g/dL    HCT 38.2 (L) 41 - 53 %    MCV 93.8 80 - 100 FL    MCH 31.0 31 - 34 PG    MCHC 33.0 31.0 - 36.0 g/dL    RDW 13.8 11.5 - 14.5 %    PLATELET 288 729 - 241 K/uL    MPV 8.5 6.5 - 11.5 FL    NRBC 0.1  WBC    ABSOLUTE NRBC 0.01 K/uL    NEUTROPHILS 60 42 - 75 %    LYMPHOCYTES 29 20.5 - 51.1 %    MONOCYTES 6 1.7 - 9.3 %    EOSINOPHILS 4 (H) 0.9 - 2.9 %    BASOPHILS 1 0.0 - 2.5 %    ABS. NEUTROPHILS 3.8 1.8 - 7.7 K/UL    ABS. LYMPHOCYTES 1.8 1.0 - 4.8 K/UL    ABS. MONOCYTES 0.4 0.2 - 2.4 K/UL    ABS. EOSINOPHILS 0.2 0.0 - 0.7 K/UL    ABS.  BASOPHILS 0.1 0.0 - 0.2 K/UL   PROTHROMBIN TIME + INR    Collection Time: 04/19/22 11:34 AM   Result Value Ref Range    Prothrombin time 14.0 11.9 - 14.6 sec    INR 1.1 0.9 - 1.1     METABOLIC PANEL, COMPREHENSIVE    Collection Time: 04/19/22 11:34 AM   Result Value Ref Range    Sodium 143 136 - 145 mmol/L    Potassium 4.2 3.5 - 5.1 mmol/L    Chloride 107 97 - 108 mmol/L    CO2 28 21 - 32 mmol/L    Anion gap 8 5 - 15 mmol/L Glucose 89 65 - 100 mg/dL    BUN 6 6 - 20 mg/dL    Creatinine 1.05 0.70 - 1.30 mg/dL    BUN/Creatinine ratio 6 (L) 12 - 20      GFR est AA >60 >60 ml/min/1.73m2    GFR est non-AA >60 >60 ml/min/1.73m2    Calcium 8.0 (L) 8.5 - 10.1 mg/dL    Bilirubin, total 0.3 0.2 - 1.0 mg/dL    AST (SGOT) 12 (L) 15 - 37 U/L    ALT (SGPT) 14 12 - 78 U/L    Alk.  phosphatase 95 45 - 117 U/L    Protein, total 7.0 6.4 - 8.2 g/dL    Albumin 3.1 (L) 3.5 - 5.0 g/dL    Globulin 3.9 2.0 - 4.0 g/dL    A-G Ratio 0.8 (L) 1.1 - 2.2     NT-PRO BNP    Collection Time: 04/19/22 11:34 AM   Result Value Ref Range    NT pro-BNP 53 <450 pg/mL   MAGNESIUM    Collection Time: 04/19/22 11:34 AM   Result Value Ref Range    Magnesium 1.9 1.6 - 2.4 mg/dL   D DIMER    Collection Time: 04/19/22 11:34 AM   Result Value Ref Range    D DIMER 1.31 (H) <0.50 ug/ml(FEU)   TSH 3RD GENERATION    Collection Time: 04/19/22 11:34 AM   Result Value Ref Range    TSH 1.07 0.36 - 3.74 uIU/mL   LIPID PANEL    Collection Time: 04/19/22 11:34 AM   Result Value Ref Range    LIPID PROFILE        Cholesterol, total 140 <200 mg/dL    Triglyceride 211 (H) <150 mg/dL    HDL Cholesterol 41 mg/dL    LDL, calculated 56.8 0 - 100 mg/dL    VLDL, calculated 42.2 mg/dL    CHOL/HDL Ratio 3.4 0.0 - 5.0     EKG, 12 LEAD, SUBSEQUENT    Collection Time: 04/19/22 12:19 PM   Result Value Ref Range    Ventricular Rate 70 BPM    Atrial Rate 70 BPM    P-R Interval 193 ms    QRS Duration 81 ms    Q-T Interval 411 ms    QTC Calculation (Bezet) 444 ms    Calculated P Axis 62 degrees    Calculated R Axis 25 degrees    Calculated T Axis 37 degrees    Diagnosis       Sinus rhythm  Ventricular premature complex  Abnormal R-wave progression, early transition    Confirmed by Mleanie Woodard M.D., Ranulfo Devine ([id number]) on 4/20/2022 10:00:15 AM     TROPONIN-HIGH SENSITIVITY    Collection Time: 04/19/22  1:15 PM   Result Value Ref Range    Troponin-High Sensitivity 7 0 - 76 ng/L   TROPONIN-HIGH SENSITIVITY    Collection Time: 04/19/22  5:24 PM   Result Value Ref Range    Troponin-High Sensitivity 6 0 - 76 ng/L   METABOLIC PANEL, BASIC    Collection Time: 04/20/22  5:28 AM   Result Value Ref Range    Sodium 140 136 - 145 mmol/L    Potassium 4.0 3.5 - 5.1 mmol/L    Chloride 106 97 - 108 mmol/L    CO2 26 21 - 32 mmol/L    Anion gap 8 5 - 15 mmol/L    Glucose 86 65 - 100 mg/dL    BUN 7 6 - 20 mg/dL    Creatinine 1.05 0.70 - 1.30 mg/dL    BUN/Creatinine ratio 7 (L) 12 - 20      GFR est AA >60 >60 ml/min/1.73m2    GFR est non-AA >60 >60 ml/min/1.73m2    Calcium 7.9 (L) 8.5 - 10.1 mg/dL   MAGNESIUM    Collection Time: 04/20/22  5:28 AM   Result Value Ref Range    Magnesium 1.9 1.6 - 2.4 mg/dL   CBC WITH AUTOMATED DIFF    Collection Time: 04/20/22  5:28 AM   Result Value Ref Range    WBC 5.9 4.4 - 11.3 K/uL    RBC 4.01 (L) 4.50 - 5.90 M/uL    HGB 12.2 (L) 13.5 - 17.5 g/dL    HCT 37.5 (L) 41 - 53 %    MCV 93.4 80 - 100 FL    MCH 30.4 (L) 31 - 34 PG    MCHC 32.5 31.0 - 36.0 g/dL    RDW 14.0 11.5 - 14.5 %    PLATELET 595 911 - 393 K/uL    MPV 8.5 6.5 - 11.5 FL    NRBC 0.1  WBC    ABSOLUTE NRBC 0.00 K/uL    NEUTROPHILS 57 42 - 75 %    LYMPHOCYTES 29 20.5 - 51.1 %    MONOCYTES 9 1.7 - 9.3 %    EOSINOPHILS 4 (H) 0.9 - 2.9 %    BASOPHILS 1 0.0 - 2.5 %    ABS. NEUTROPHILS 3.4 1.8 - 7.7 K/UL    ABS. LYMPHOCYTES 1.7 1.0 - 4.8 K/UL    ABS. MONOCYTES 0.5 0.2 - 2.4 K/UL    ABS. EOSINOPHILS 0.2 0.0 - 0.7 K/UL    ABS.  BASOPHILS 0.0 0.0 - 0.2 K/UL   ECHO ADULT COMPLETE    Collection Time: 04/20/22 10:23 AM   Result Value Ref Range    EF BP 70 55 - 100 %    Aortic Root 4.2 cm    Ao Root Index 1.99 cm/m2    TAPSE 2.3 1.7 cm    TAPSV 13 cm/s    IVSd 1.0 0.6 - 1.0 cm    LVIDd 4.6 4.2 - 5.9 cm    LVIDs 3.1 cm    LVPWd 1.1 (A) 0.6 - 1.0 cm    LV EDV A2C 33 mL    LV EDV A4C 52 mL    LV ESV A2C 12 mL    LV ESV A4C 13 mL    LVOT Peak Gradient 4 mmHg    LVOT Mean Gradient 1 mmHg    LVOT VTI 16.4 cm    RVIDd 3.0 cm    LA Volume 2C 29 18 - 58 mL    LA Volume 2C 21 18 - 58 mL    AV Mean Gradient 3 mmHg    AV Peak Velocity 1.2 m/s    AV VTI 24.2 cm    MV A Velocity 0.85 m/s    MV E Wave Deceleration Time 241.2 ms    MV E Velocity 0.80 m/s    MV PHT 70.0 ms    Fractional Shortening 2D 33 28 - 44 %    LV ESV Index A4C 6 mL/m2    LV EDV Index A4C 25 mL/m2    LV ESV Index A2C 6 mL/m2    LV EDV Index A2C 16 mL/m2    LVIDd Index 2.18 cm/m2    LVIDs Index 1.47 cm/m2    LV RWT Ratio 0.48     LV Mass 2D 169.9 88 - 224 g    LV Mass 2D Index 80.5 49 - 115 g/m2    MV E/A 0.94     LVOT:AV VTI Index 0.68     MV Area by PHT 3.1 cm2       XR Results (maximum last 3): Results from [place] encounter on 04/19/22    XR CHEST PORT      CT Results (maximum last 3): Results from [place] encounter on 04/19/22    CTA CHEST W OR W WO CONT    Impression  No pulmonary embolism definitively identified. Other findings as  above. Results from [place] encounter on 03/15/22    CT HEAD WO CONT    Impression  Mild microvascular ischemic changes. No acute intracranial abnormality. MRI Results (maximum last 3): No results found for this or any previous visit. Nuclear Medicine Results (maximum last 3): No results found for this or any previous visit. US Results (maximum last 3): No results found for this or any previous visit. VAS/US Results (maximum last 3): No results found for this or any previous visit.         Current Facility-Administered Medications:     magnesium sulfate 2 g/50 ml IVPB (premix or compounded), 2 g, IntraVENous, ONCE, Russell Garza NP, Last Rate: 25 mL/hr at 04/20/22 1032, 2 g at 04/20/22 1032    sodium chloride (NS) flush 5-40 mL, 5-40 mL, IntraVENous, Q8H, Adebayo Mcmahon MD, 10 mL at 04/20/22 0553    sodium chloride (NS) flush 5-40 mL, 5-40 mL, IntraVENous, PRN, Ridge Mcmahon MD    acetaminophen (TYLENOL) tablet 650 mg, 650 mg, Oral, Q6H PRN **OR** acetaminophen (TYLENOL) suppository 650 mg, 650 mg, Rectal, Q6H PRN, Adebayo Mcmahon MD    polyethylene glycol (MIRALAX) packet 17 g, 17 g, Oral, DAILY PRN, Adebayo Mcmahon MD    enoxaparin (LOVENOX) injection 40 mg, 40 mg, SubCUTAneous, DAILY, Adebayo Mcmahon MD    ondansetron (ZOFRAN) injection 4 mg, 4 mg, IntraVENous, Q6H PRN, Adebayo Mcmahon MD    cetirizine (ZYRTEC) tablet 10 mg, 10 mg, Oral, QPM, Adebayo Mcmahon MD, 10 mg at 04/19/22 1832    lisinopriL (PRINIVIL, ZESTRIL) tablet 10 mg, 10 mg, Oral, DAILY, Adebayo Mcmahon MD, 10 mg at 04/20/22 1032    pantoprazole (PROTONIX) tablet 40 mg, 40 mg, Oral, ACB, Adebayo Mcmahon MD, 40 mg at 04/20/22 1032    tamsulosin (FLOMAX) capsule 0.4 mg, 0.4 mg, Oral, DAILY, Adebayo Mcmahon MD, 0.4 mg at 04/20/22 1032    traMADoL (ULTRAM) tablet 50 mg, 50 mg, Oral, Q6H PRN, Adebayo Mcmahon MD    isosorbide mononitrate ER (IMDUR) tablet 30 mg, 30 mg, Oral, DAILY, Adebayo Mcmahon MD, 30 mg at 04/20/22 1032          Case discussed with collaborating physician Dr. Jaiden Red and our impression and recommendations are as follows:   1. Chest Pain:  ACS ruled out with negative EKG and normal HS Trop trends of 6-7-6. TTE pending for this am.  Patient had Cardiac Catheterization 4/30/21 @ [facility] that showed mLAD lesion, but otherwise minimal luminal irregularities throughout. Pain likely MSK in nature. Continue risk factor modification and cardioprotective medications. Can follow-up in OP setting in 1 week for further optimization of medications. TSH 1.07. LDL 56, Triglycerides 211. Consider nutritionist consult to discuss dietary adjustments. 2. Hypomagnesemia:  Repletion ordered. Goal >2. K WNL. 3. Chronic Lung Disease:  CTA Chest showing emphysematic changes and atelectasis. Further per primary. 4. Thoracic Aorta Dilatation:  CTA Chest showed distal Thoracic arch dilated @ 3.1cm. TTE pending. No sx. Keep BP under control. Continue serial monitoring.    5. HTN: BP at goal. Continue current regimen. Thank you for involving us in the care of this patient. Please do not hesitate to call if additional questions arise.  If after hours please call [phone]

## 2022-04-20 NOTE — PROGRESS NOTES
Problem: Falls - Risk of  Goal: *Absence of Falls  Description: Document Megan Padron Fall Risk and appropriate interventions in the flowsheet.   Outcome: Resolved/Met  Note: Fall Risk Interventions:  Mobility Interventions: Patient to call before getting OOB,Utilize walker, cane, or other assistive device         Medication Interventions: Patient to call before getting OOB

## 2022-04-20 NOTE — DISCHARGE SUMMARY
Physician Discharge Summary       Patient: Stephanie Michele MRN: [id number]     YOB: 1940  Age: 81 y.o. Sex: male    PCP: Dagmar Neumann MD    Allergies: Patient has no known allergies. Admit date: 4/19/2022  Admitting Provider: Marshall Moreno MD    Discharge date: 4/20/2022  Discharging Provider: Marshall Moreno MD    * Admission Diagnoses:   Chest pain [R07.9]      * Discharge Diagnoses:    Hospital Problems as of 4/20/2022 Never Reviewed          Codes Class Noted - Resolved POA    Positive D dimer ICD-10-CM: R79.89  ICD-9-CM: 790.92  4/19/2022 - Present Unknown        HTN (hypertension) ICD-10-CM: I10  ICD-9-CM: 401.9  4/19/2022 - Present Unknown        CAD (coronary artery disease) ICD-10-CM: I25.10  ICD-9-CM: 414.00  4/19/2022 - Present Unknown        GERD (gastroesophageal reflux disease) ICD-10-CM: K21.9  ICD-9-CM: 530.81  4/19/2022 - Present Unknown        * (Principal) Chest pain ICD-10-CM: R07.9  ICD-9-CM: 786.50  4/30/2021 - Present Unknown                * Hospital Course: As per admitting provider on 4/19:  Stephanie Michele is a 81 y.o. male followed by Dagmar Neumann MD and [facility] cardiology  has a past medical history of GERD (gastroesophageal reflux disease), Hypertension, and Stroke [facility]). Presents from home with onset of central chest pain. He stated that yesterday he was driving and he had his discomfort in the lower part of his chest with no radiation and no associated symptoms and said it went away after about 15 minutes. He has over the past 1 week been up and down climbing a ladder onto a roof and doing some minimally heavy lifting to help his grandson. He notes that last night he had a sit up for a little while when symptoms recurred and then was able to go back to sleep but this morning patient felt similar symptoms again so came to the emergency room to get evaluated.   He states that the pain does not feel like his typical heartburn symptoms and there was note of shortness of breath but he says that he is always short of breath from time to time chronically and it was not associated with the pain he was having. On initial evaluation in the emergency room her blood pressure 135/78 with a heart rate of 73 afebrile and O2 sat 99% on room air. EKG showed sinus rhythm at a rate of 74 no ischemic changes noted initial troponins high-sensitivity x2 were both negative and BNP was only 53 and rest of his laboratory data was within his normal limits except for a D-dimer which was mildly elevated at 1.31 per patient not having noted any hypoxia or presented with tachycardia but with patient's history and sudden onset of symptoms will obtain CTA of the chest to further evaluate.   The patient was then referred for observation for chest pain rule out ACS    Chest pain   - hx of positive stress test but left heart cath done April 2021 by Dr Gerson Bray  shows nonobstructive disease with only 30% lesion in the LAD  - troponin x 3 are negative with normal BNP and negative chest x-ray and EKG lab showing any evidence of ischemic changes  -TSH is in normal range and lipid profile shows elevated triglyceride  -Cardiology consult appreciated and 2D echo was performed and prelim shows normal diastolic function EF about 70% and no evidence of fluid overload  -Patient stable for discharge home continue patient on his home medications and follow-up with cardiology in 2 weeks    Hypomagnesia   -Only slightly low at 1.9 and was given a IV magnesium sulfate rider prior to discharge    Elevated d dimer  - was 1.31   - presents with sudden chest pain and sob   -CTA of the chest was negative for PE but does show centrilobular emphysema and atelectatic changes no evidence of pneumonia or pleural effusion noted nodule in the right lower lobe posterior aspect measures 4 to 5 mm and has not significantly changed when compared to CT done on 12/12/2018     All other chronic medical problems stable and was continued on his medications with no changes       * Procedures:   * No surgery found *        Consults:   []Sindy for details             CONSULTATION     REASON FOR CONSULT:  Chest Pain     REQUESTING PROVIDER:  Dr. Jonas Polk:        Chief Complaint   Patient presents with    Chest Pain            HISTORY OF PRESENT ILLNESS:  Hermelindo Bess is a 81y.o. year-old male with past medical history significant for GERD, HTN, CVA, Nonobstructive CAD (Cath 4/30/21) last [facility] in clinic [facility]21[facility]21 who presented to ED for evaluation of chest pain. He stated that 4/18 he was driving and had his discomfort in the lower part of his chest with no radiation or associated symptoms and spontaneously resolved after 15 minutes.  Had 2 further similar episodes overnight/early 4/19 am, so he presented to ED for evaluation. Workup in ED revealed Dimer 1.31, but CTA negative for PE, BNP 53, HS Trop  6->7, TSH 1.07 and HR/BP/SPO2 were all WNL. He was referred for admission for further monitoring and trending given risk factors. He did report that over the past 1 has been frequently climbing a ladder onto a roof and doing some lifting to help his grandson. This am he has no complaints and reports that he is feeling well. No sx overnight either.       Records from hospital admission course thus far reviewed.       Telemetry Review: SR        PAST MEDICAL HISTORY:         Past Medical History:   Diagnosis Date    GERD (gastroesophageal reflux disease)      Hypertension      Stroke [facility])           PAST SURGICAL HISTORY: No past surgical history on file.     ALLERGIES:  No Known Allergies     FAMILY HISTORY:    Family History   Family history unknown:  Yes         SOCIAL HISTORY:    Social History           Tobacco Use    Smoking status: Never Smoker    Smokeless tobacco: Never Used   Substance Use Topics    Alcohol use: Never    Drug use: Never            HOME MEDICATIONS:    Prior to Admission Medications   Prescriptions Last Dose Informant Patient Reported? Taking? cetirizine (ZYRTEC) 10 mg tablet     Yes No   Sig: Take  by mouth nightly.   gabapentin (NEURONTIN) 600 mg tablet     Yes No   Sig: Take 600 mg by mouth daily. isosorbide mononitrate ER (IMDUR) 30 mg tablet     Yes No   Sig: Take 30 mg by mouth daily. lisinopriL (PRINIVIL, ZESTRIL) 10 mg tablet     Yes No   Sig: Take 10 mg by mouth daily. pantoprazole (PROTONIX) 40 mg tablet     Yes No   Sig: Take 40 mg by mouth daily. tamsulosin (FLOMAX) 0.4 mg capsule     Yes No   Sig: Take 0.4 mg by mouth daily. traMADoL (ULTRAM) 50 mg tablet     Yes No   Sig: Take 50 mg by mouth every six (6) hours as needed for Pain. Facility-Administered Medications: None        REVIEW OF SYSTEMS:  Complete review of systems performed, pertinents noted above, all other systems are negative.     Patient Vitals for the past 24 hrs:    Temp Pulse Resp BP SpO2   04/20/22 0744 98.2 °F (36.8 °C) 68 18 125/70 94 %   04/20/22 0400  71      04/20/22 0327 99.1 °F (37.3 °C) 68 18 110/66 95 %   04/20/22 0000  66      04/19/22 2316 98.4 °F (36.9 °C) 64 18 135/67 95 %   04/19/22 2000  65      04/19/22 1932 97.7 °F (36.5 °C) 67 18 131/76 95 %   04/19/22 1610 97.5 °F (36.4 °C) 64 18 (!) 149/85 95 %   04/19/22 1600  64      04/19/22 1500  72 16 (!) 147/74 98 %   04/19/22 1400  70 18 128/76 98 %   04/19/22 1300  69 16 125/77 98 %   04/19/22 1230  69 24 129/77 99 %   04/19/22 1200  70 16 128/73 99 %   04/19/22 1112     100 %   04/19/22 1109 98.4 °F (36.9 °C) 73 18 135/78 99 %         PHYSICAL EXAMINATION:    General: Well nourished male lying inb ed, NAD, A&O  HEENT: Normocephalic, PERRL, no drainage, extremely hard of hearing  Neck: Supple, Trachea midline, No JVD  RESP: CTA bilaterally. + Symmetrical chest movement. No SOB or distress.  On RA  Cardiovascular: RRR no MRG  PVS: No rubor, cyanosis, no edema, Radial, DP, PT pulses equal bilaterally  ABD: obese, soft, NT, Normoactive BS  Derm: Warm/Dry/Intact with no lesions, normal turgor  Neuro: A&O PPTS, cranial nerves II- XII grossly intact via interaction with patient. No focal deficits  PSYCH: No anxiety or agitation      Electrocardiogram performed earlier reviewed, it shows Sr, no acute ischemia     Recent labs results and imaging reviewed.       Recent Results          Recent Results (from the past 24 hour(s))   EKG, 12 LEAD, INITIAL     Collection Time: 04/19/22 11:04 AM   Result Value Ref Range     Ventricular Rate 74 BPM     Atrial Rate 74 BPM     P-R Interval 182 ms     QRS Duration 87 ms     Q-T Interval 387 ms     QTC Calculation (Bezet) 430 ms     Calculated P Axis 64 degrees     Calculated R Axis 39 degrees     Calculated T Axis 61 degrees     Diagnosis           Sinus rhythm     Confirmed by Jackelin Scott M.D., Esteban Johnson ([id number]) on 4/20/2022 10:02:29 AM      TROPONIN-HIGH SENSITIVITY     Collection Time: 04/19/22 11:30 AM   Result Value Ref Range     Troponin-High Sensitivity 6 0 - 76 ng/L   CBC WITH AUTOMATED DIFF     Collection Time: 04/19/22 11:34 AM   Result Value Ref Range     WBC 6.3 4.4 - 11.3 K/uL     RBC 4.07 (L) 4.50 - 5.90 M/uL     HGB 12.6 (L) 13.5 - 17.5 g/dL     HCT 38.2 (L) 41 - 53 %     MCV 93.8 80 - 100 FL     MCH 31.0 31 - 34 PG     MCHC 33.0 31.0 - 36.0 g/dL     RDW 13.8 11.5 - 14.5 %     PLATELET 287 503 - 755 K/uL     MPV 8.5 6.5 - 11.5 FL     NRBC 0.1  WBC     ABSOLUTE NRBC 0.01 K/uL     NEUTROPHILS 60 42 - 75 %     LYMPHOCYTES 29 20.5 - 51.1 %     MONOCYTES 6 1.7 - 9.3 %     EOSINOPHILS 4 (H) 0.9 - 2.9 %     BASOPHILS 1 0.0 - 2.5 %     ABS. NEUTROPHILS 3.8 1.8 - 7.7 K/UL     ABS. LYMPHOCYTES 1.8 1.0 - 4.8 K/UL     ABS. MONOCYTES 0.4 0.2 - 2.4 K/UL     ABS. EOSINOPHILS 0.2 0.0 - 0.7 K/UL     ABS.  BASOPHILS 0.1 0.0 - 0.2 K/UL   PROTHROMBIN TIME + INR     Collection Time: 04/19/22 11:34 AM   Result Value Ref Range     Prothrombin time 14.0 11.9 - 14.6 sec     INR 1.1 0.9 - 1.1     METABOLIC PANEL, COMPREHENSIVE     Collection Time: 04/19/22 11:34 AM   Result Value Ref Range     Sodium 143 136 - 145 mmol/L     Potassium 4.2 3.5 - 5.1 mmol/L     Chloride 107 97 - 108 mmol/L     CO2 28 21 - 32 mmol/L     Anion gap 8 5 - 15 mmol/L     Glucose 89 65 - 100 mg/dL     BUN 6 6 - 20 mg/dL     Creatinine 1.05 0.70 - 1.30 mg/dL     BUN/Creatinine ratio 6 (L) 12 - 20       GFR est AA >60 >60 ml/min/1.73m2     GFR est non-AA >60 >60 ml/min/1.73m2     Calcium 8.0 (L) 8.5 - 10.1 mg/dL     Bilirubin, total 0.3 0.2 - 1.0 mg/dL     AST (SGOT) 12 (L) 15 - 37 U/L     ALT (SGPT) 14 12 - 78 U/L     Alk.  phosphatase 95 45 - 117 U/L     Protein, total 7.0 6.4 - 8.2 g/dL     Albumin 3.1 (L) 3.5 - 5.0 g/dL     Globulin 3.9 2.0 - 4.0 g/dL     A-G Ratio 0.8 (L) 1.1 - 2.2     NT-PRO BNP     Collection Time: 04/19/22 11:34 AM   Result Value Ref Range     NT pro-BNP 53 <450 pg/mL   MAGNESIUM     Collection Time: 04/19/22 11:34 AM   Result Value Ref Range     Magnesium 1.9 1.6 - 2.4 mg/dL   D DIMER     Collection Time: 04/19/22 11:34 AM   Result Value Ref Range     D DIMER 1.31 (H) <0.50 ug/ml(FEU)   TSH 3RD GENERATION     Collection Time: 04/19/22 11:34 AM   Result Value Ref Range     TSH 1.07 0.36 - 3.74 uIU/mL   LIPID PANEL     Collection Time: 04/19/22 11:34 AM   Result Value Ref Range     LIPID PROFILE         Cholesterol, total 140 <200 mg/dL     Triglyceride 211 (H) <150 mg/dL     HDL Cholesterol 41 mg/dL     LDL, calculated 56.8 0 - 100 mg/dL     VLDL, calculated 42.2 mg/dL     CHOL/HDL Ratio 3.4 0.0 - 5.0     EKG, 12 LEAD, SUBSEQUENT     Collection Time: 04/19/22 12:19 PM   Result Value Ref Range     Ventricular Rate 70 BPM     Atrial Rate 70 BPM     P-R Interval 193 ms     QRS Duration 81 ms     Q-T Interval 411 ms     QTC Calculation (Bezet) 444 ms     Calculated P Axis 62 degrees     Calculated R Axis 25 degrees     Calculated T Axis 37 degrees     Diagnosis           Sinus rhythm  Ventricular premature complex  Abnormal R-wave progression, early transition     Confirmed by Candace Rizo M.D., [place] ([id number]) on 4/20/2022 10:00:15 AM      TROPONIN-HIGH SENSITIVITY     Collection Time: 04/19/22  1:15 PM   Result Value Ref Range     Troponin-High Sensitivity 7 0 - 76 ng/L   TROPONIN-HIGH SENSITIVITY     Collection Time: 04/19/22  5:24 PM   Result Value Ref Range     Troponin-High Sensitivity 6 0 - 76 ng/L   METABOLIC PANEL, BASIC     Collection Time: 04/20/22  5:28 AM   Result Value Ref Range     Sodium 140 136 - 145 mmol/L     Potassium 4.0 3.5 - 5.1 mmol/L     Chloride 106 97 - 108 mmol/L     CO2 26 21 - 32 mmol/L     Anion gap 8 5 - 15 mmol/L     Glucose 86 65 - 100 mg/dL     BUN 7 6 - 20 mg/dL     Creatinine 1.05 0.70 - 1.30 mg/dL     BUN/Creatinine ratio 7 (L) 12 - 20       GFR est AA >60 >60 ml/min/1.73m2     GFR est non-AA >60 >60 ml/min/1.73m2     Calcium 7.9 (L) 8.5 - 10.1 mg/dL   MAGNESIUM     Collection Time: 04/20/22  5:28 AM   Result Value Ref Range     Magnesium 1.9 1.6 - 2.4 mg/dL   CBC WITH AUTOMATED DIFF     Collection Time: 04/20/22  5:28 AM   Result Value Ref Range     WBC 5.9 4.4 - 11.3 K/uL     RBC 4.01 (L) 4.50 - 5.90 M/uL     HGB 12.2 (L) 13.5 - 17.5 g/dL     HCT 37.5 (L) 41 - 53 %     MCV 93.4 80 - 100 FL     MCH 30.4 (L) 31 - 34 PG     MCHC 32.5 31.0 - 36.0 g/dL     RDW 14.0 11.5 - 14.5 %     PLATELET 338 908 - 251 K/uL     MPV 8.5 6.5 - 11.5 FL     NRBC 0.1  WBC     ABSOLUTE NRBC 0.00 K/uL     NEUTROPHILS 57 42 - 75 %     LYMPHOCYTES 29 20.5 - 51.1 %     MONOCYTES 9 1.7 - 9.3 %     EOSINOPHILS 4 (H) 0.9 - 2.9 %     BASOPHILS 1 0.0 - 2.5 %     ABS. NEUTROPHILS 3.4 1.8 - 7.7 K/UL     ABS. LYMPHOCYTES 1.7 1.0 - 4.8 K/UL     ABS. MONOCYTES 0.5 0.2 - 2.4 K/UL     ABS. EOSINOPHILS 0.2 0.0 - 0.7 K/UL     ABS.  BASOPHILS 0.0 0.0 - 0.2 K/UL   ECHO ADULT COMPLETE     Collection Time: 04/20/22 10:23 AM   Result Value Ref Range     EF BP 70 55 - 100 %     Aortic Root 4.2 cm     Ao Root Index 1.99 cm/m2     TAPSE 2.3 1.7 cm     TAPSV 13 cm/s     IVSd 1.0 0.6 - 1.0 cm     LVIDd 4.6 4.2 - 5.9 cm     LVIDs 3.1 cm     LVPWd 1.1 (A) 0.6 - 1.0 cm     LV EDV A2C 33 mL     LV EDV A4C 52 mL     LV ESV A2C 12 mL     LV ESV A4C 13 mL     LVOT Peak Gradient 4 mmHg     LVOT Mean Gradient 1 mmHg     LVOT VTI 16.4 cm     RVIDd 3.0 cm     LA Volume 2C 29 18 - 58 mL     LA Volume 2C 21 18 - 58 mL     AV Mean Gradient 3 mmHg     AV Peak Velocity 1.2 m/s     AV VTI 24.2 cm     MV A Velocity 0.85 m/s     MV E Wave Deceleration Time 241.2 ms     MV E Velocity 0.80 m/s     MV PHT 70.0 ms     Fractional Shortening 2D 33 28 - 44 %     LV ESV Index A4C 6 mL/m2     LV EDV Index A4C 25 mL/m2     LV ESV Index A2C 6 mL/m2     LV EDV Index A2C 16 mL/m2     LVIDd Index 2.18 cm/m2     LVIDs Index 1.47 cm/m2     LV RWT Ratio 0.48       LV Mass 2D 169.9 88 - 224 g     LV Mass 2D Index 80.5 49 - 115 g/m2     MV E/A 0.94       LVOT:AV VTI Index 0.68       MV Area by PHT 3.1 cm2            XR Results (maximum last 3): Results from [place] encounter on 04/19/22     XR CHEST PORT        CT Results (maximum last 3): Results from [place] encounter on 04/19/22     CTA CHEST W OR W WO CONT     Impression  No pulmonary embolism definitively identified. Other findings as  above.        Results from Hospital Encounter encounter on 03/15/22     CT HEAD WO CONT     Impression  Mild microvascular ischemic changes.     No acute intracranial abnormality.        MRI Results (maximum last 3): No results found for this or any previous visit.        Nuclear Medicine Results (maximum last 3): No results found for this or any previous visit.        US Results (maximum last 3): No results found for this or any previous visit.        VAS/US Results (maximum last 3):   No results found for this or any previous visit.           Current Facility-Administered Medications:     magnesium sulfate 2 g/50 ml IVPB (premix or compounded), 2 g, IntraVENous, ONCE, Meghan Garza NP, Last Rate: 25 mL/hr at 04/20/22 1032, 2 g at 04/20/22 1032    sodium chloride (NS) flush 5-40 mL, 5-40 mL, IntraVENous, Q8H, Adebayo Mcmahon MD, 10 mL at 04/20/22 0553    sodium chloride (NS) flush 5-40 mL, 5-40 mL, IntraVENous, PRN, Drake Mcmahon MD    acetaminophen (TYLENOL) tablet 650 mg, 650 mg, Oral, Q6H PRN **OR** acetaminophen (TYLENOL) suppository 650 mg, 650 mg, Rectal, Q6H PRN, Adebayo Mcmahon MD    polyethylene glycol (MIRALAX) packet 17 g, 17 g, Oral, DAILY PRN, Adebayo Mcmahon MD    enoxaparin (LOVENOX) injection 40 mg, 40 mg, SubCUTAneous, DAILY, Adebayo Mcmahon MD    ondansetron (ZOFRAN) injection 4 mg, 4 mg, IntraVENous, Q6H PRN, Adebayo Mcmahon MD    cetirizine (ZYRTEC) tablet 10 mg, 10 mg, Oral, QPM, Adebyao Mcmahon MD, 10 mg at 04/19/22 1832    lisinopriL (PRINIVIL, ZESTRIL) tablet 10 mg, 10 mg, Oral, DAILY, Adebayo Mcmahon MD, 10 mg at 04/20/22 1032    pantoprazole (PROTONIX) tablet 40 mg, 40 mg, Oral, ACB, Adebayo Mcmahon MD, 40 mg at 04/20/22 1032    tamsulosin (FLOMAX) capsule 0.4 mg, 0.4 mg, Oral, DAILY, Adebayo Mcmahon MD, 0.4 mg at 04/20/22 1032    traMADoL (ULTRAM) tablet 50 mg, 50 mg, Oral, Q6H PRN, Adebayo Mcmahon MD    isosorbide mononitrate ER (IMDUR) tablet 30 mg, 30 mg, Oral, DAILY, Adebayo Mcmahon MD, 30 mg at 04/20/22 1032              Case discussed with collaborating physician Dr. Elvira Lynn and our impression and recommendations are as follows:   1. Chest Pain:  ACS ruled out with negative EKG and normal HS Trop trends of 6-7-6. TTE pending for this am.  Patient had Cardiac Catheterization 4/30/21 @ [facility] that showed mLAD lesion, but otherwise minimal luminal irregularities throughout. Pain likely MSK in nature. Continue risk factor modification and cardioprotective medications.   Can follow-up in OP setting in 1 week for further optimization of medications. TSH 1.07. LDL 56, Triglycerides 211. Consider nutritionist consult to discuss dietary adjustments. 2. Hypomagnesemia:  Repletion ordered. Goal >2. K WNL. 3. Chronic Lung Disease:  CTA Chest showing emphysematic changes and atelectasis. Further per primary. 4. Thoracic Aorta Dilatation:  CTA Chest showed distal Thoracic arch dilated @ 3.1cm. TTE pending. No sx. Keep BP under control. Continue serial monitoring. 5. HTN: BP at goal. Continue current regimen.      Thank you for involving us in the care of this patient. Please do not hesitate to call if additional questions arise. If after hours please call [phone]      Vitals Last 24 Hours:  Patient Vitals for the past 24 hrs:   Temp Pulse Resp BP SpO2   04/20/22 0744 98.2 °F (36.8 °C) 68 18 125/70 94 %   04/20/22 0400  71      04/20/22 0327 99.1 °F (37.3 °C) 68 18 110/66 95 %   04/20/22 0000  66      04/19/22 2316 98.4 °F (36.9 °C) 64 18 135/67 95 %   04/19/22 2000  65      04/19/22 1932 97.7 °F (36.5 °C) 67 18 131/76 95 %   04/19/22 1610 97.5 °F (36.4 °C) 64 18 (!) 149/85 95 %   04/19/22 1600  64      04/19/22 1500  72 16 (!) 147/74 98 %   04/19/22 1400  70 18 128/76 98 %   04/19/22 1300  69 16 125/77 98 %   04/19/22 1230  69 24 129/77 99 %   04/19/22 1200  70 16 128/73 99 %        Discharge Exam:  General: Alert, cooperative, no distress, appears stated age. Head:  Normocephalic, without obvious abnormality, atraumatic. Eyes:  Conjunctivae/corneas clear. Pupils equal, round, reactive to light. Extraocular movements intact. Lungs:  Clear to auscultation bilaterally. no wheeze, rales, crackles, rhonchi   Chest wall: No tenderness or deformity. Heart:  Regular rate and rhythm, S1, S2 normal, no murmur, click, rub or gallop. Abdomen:  Soft, non-tender. Bowel sounds normal. No masses,  No organomegaly. Extremities: Extremities normal, atraumatic, no cyanosis or edema. Pulses: 2+ and symmetric all extremities.   Skin: Skin color, texture, turgor normal. No rashes or lesions  Neurologic: Awake, Alert, oriented.  No obvious gross sensory or motor deficits    Labs:  Recent Results (from the past 24 hour(s))   EKG, 12 LEAD, SUBSEQUENT    Collection Time: 04/19/22 12:19 PM   Result Value Ref Range    Ventricular Rate 70 BPM    Atrial Rate 70 BPM    P-R Interval 193 ms    QRS Duration 81 ms    Q-T Interval 411 ms    QTC Calculation (Bezet) 444 ms    Calculated P Axis 62 degrees    Calculated R Axis 25 degrees    Calculated T Axis 37 degrees    Diagnosis       Sinus rhythm  Ventricular premature complex  Abnormal R-wave progression, early transition    Confirmed by Stephen Hughes M.D., Lesa Berman ([id number]) on 4/20/2022 10:00:15 AM     TROPONIN-HIGH SENSITIVITY    Collection Time: 04/19/22  1:15 PM   Result Value Ref Range    Troponin-High Sensitivity 7 0 - 76 ng/L   TROPONIN-HIGH SENSITIVITY    Collection Time: 04/19/22  5:24 PM   Result Value Ref Range    Troponin-High Sensitivity 6 0 - 76 ng/L   METABOLIC PANEL, BASIC    Collection Time: 04/20/22  5:28 AM   Result Value Ref Range    Sodium 140 136 - 145 mmol/L    Potassium 4.0 3.5 - 5.1 mmol/L    Chloride 106 97 - 108 mmol/L    CO2 26 21 - 32 mmol/L    Anion gap 8 5 - 15 mmol/L    Glucose 86 65 - 100 mg/dL    BUN 7 6 - 20 mg/dL    Creatinine 1.05 0.70 - 1.30 mg/dL    BUN/Creatinine ratio 7 (L) 12 - 20      GFR est AA >60 >60 ml/min/1.73m2    GFR est non-AA >60 >60 ml/min/1.73m2    Calcium 7.9 (L) 8.5 - 10.1 mg/dL   MAGNESIUM    Collection Time: 04/20/22  5:28 AM   Result Value Ref Range    Magnesium 1.9 1.6 - 2.4 mg/dL   CBC WITH AUTOMATED DIFF    Collection Time: 04/20/22  5:28 AM   Result Value Ref Range    WBC 5.9 4.4 - 11.3 K/uL    RBC 4.01 (L) 4.50 - 5.90 M/uL    HGB 12.2 (L) 13.5 - 17.5 g/dL    HCT 37.5 (L) 41 - 53 %    MCV 93.4 80 - 100 FL    MCH 30.4 (L) 31 - 34 PG    MCHC 32.5 31.0 - 36.0 g/dL    RDW 14.0 11.5 - 14.5 %    PLATELET 024 364 - 705 K/uL    MPV 8.5 6.5 - 11.5 FL    NRBC 0. 1  WBC    ABSOLUTE NRBC 0.00 K/uL    NEUTROPHILS 57 42 - 75 %    LYMPHOCYTES 29 20.5 - 51.1 %    MONOCYTES 9 1.7 - 9.3 %    EOSINOPHILS 4 (H) 0.9 - 2.9 %    BASOPHILS 1 0.0 - 2.5 %    ABS. NEUTROPHILS 3.4 1.8 - 7.7 K/UL    ABS. LYMPHOCYTES 1.7 1.0 - 4.8 K/UL    ABS. MONOCYTES 0.5 0.2 - 2.4 K/UL    ABS. EOSINOPHILS 0.2 0.0 - 0.7 K/UL    ABS. BASOPHILS 0.0 0.0 - 0.2 K/UL   ECHO ADULT COMPLETE    Collection Time: 04/20/22 10:23 AM   Result Value Ref Range    EF BP 70 55 - 100 %    Aortic Root 4.2 cm    Ao Root Index 1.99 cm/m2    TAPSE 2.3 1.7 cm    TAPSV 13 cm/s    IVSd 1.0 0.6 - 1.0 cm    LVIDd 4.6 4.2 - 5.9 cm    LVIDs 3.1 cm    LVPWd 1.1 (A) 0.6 - 1.0 cm    LV EDV A2C 33 mL    LV EDV A4C 52 mL    LV ESV A2C 12 mL    LV ESV A4C 13 mL    LVOT Peak Gradient 4 mmHg    LVOT Mean Gradient 1 mmHg    LVOT VTI 16.4 cm    RVIDd 3.0 cm    LA Volume 2C 29 18 - 58 mL    LA Volume 2C 21 18 - 58 mL    AV Mean Gradient 3 mmHg    AV Peak Velocity 1.2 m/s    AV VTI 24.2 cm    MV A Velocity 0.85 m/s    MV E Wave Deceleration Time 241.2 ms    MV E Velocity 0.80 m/s    MV PHT 70.0 ms    Fractional Shortening 2D 33 28 - 44 %    LV ESV Index A4C 6 mL/m2    LV EDV Index A4C 25 mL/m2    LV ESV Index A2C 6 mL/m2    LV EDV Index A2C 16 mL/m2    LVIDd Index 2.18 cm/m2    LVIDs Index 1.47 cm/m2    LV RWT Ratio 0.48     LV Mass 2D 169.9 88 - 224 g    LV Mass 2D Index 80.5 49 - 115 g/m2    MV E/A 0.94     LVOT:AV VTI Index 0.68     MV Area by PHT 3.1 cm2         Imaging:  CTA CHEST W OR W WO CONT    Result Date: 4/19/2022  No pulmonary embolism definitively identified. Other findings as above. * Discharge Condition: improved  * Disposition: Home w/Family      Discharge Medications:  Current Discharge Medication List      CONTINUE these medications which have NOT CHANGED    Details   gabapentin (NEURONTIN) 600 mg tablet Take 600 mg by mouth daily. lisinopriL (PRINIVIL, ZESTRIL) 10 mg tablet Take 10 mg by mouth daily. tamsulosin (FLOMAX) 0.4 mg capsule Take 0.4 mg by mouth daily. traMADoL (ULTRAM) 50 mg tablet Take 50 mg by mouth every six (6) hours as needed for Pain.      pantoprazole (PROTONIX) 40 mg tablet Take 40 mg by mouth daily. cetirizine (ZYRTEC) 10 mg tablet Take  by mouth nightly. isosorbide mononitrate ER (IMDUR) 30 mg tablet Take 30 mg by mouth daily. * Follow-up Care/Patient Instructions: Activity: Activity as tolerated  Diet: Cardiac Diet      Follow-up Information     Follow up With Specialties Details Why Contact Info    Garth Anne MD Family Medicine On 4/26/2022 @ 10:45 67 [place] [address]  [phone]      [facility] page NP  In 2 weeks  [address] [facility]         Spent 30 minutes evaluting and coordinating patient care and discharge home  of which >50% was spent coordinating and counseling.      Signed:  Suraj Babin MD  4/20/2022  11:34 AM

## 2022-04-20 NOTE — ACP (ADVANCE CARE PLANNING)
Advance Care Planning   Healthcare Decision Maker:       Primary Decision Maker: [address] - Child - [phone]    Click here to complete [address] including selection of the Healthcare Decision Maker Relationship (ie \"Primary\")

## 2022-04-20 NOTE — PROGRESS NOTES
Care Management Interventions  PCP Verified by CM: Yes (Dr. Rommel Graham NP- last seen about a month ago)  Mode of Transport at Discharge: Self  Transition of Care Consult (CM Consult): Discharge Planning  Support Systems: Child(david),Other Family Member(s)  Confirm Follow Up Transport: Self  The Plan for Transition of Care is Related to the Following Treatment Goals : Patient lives alone and is independent wth his own care. Drove himself to the hospital. No discharge planning needs identified.   Discharge Location  Patient Expects to be Discharged to[de-identified] Home

## 2022-04-20 NOTE — PROGRESS NOTES
Patients case reviewed during interdisciplinary team meeting in [address] [place] [address]/Acute Care Unit. Rev.  Chante Barrientos 53, [address]

## 2022-05-01 ENCOUNTER — TELEPHONE (OUTPATIENT)
Dept: INTERNAL MEDICINE | Age: 82
End: 2022-05-01

## 2022-05-01 NOTE — TELEPHONE ENCOUNTER
Attempted follow up patient most recent hospital stay for chest pain.  Was able to leave voice message on mobile phone number listed and will attempt to call again

## 2022-10-25 ENCOUNTER — TELEPHONE (OUTPATIENT)
Dept: GASTROENTEROLOGY | Age: 82
End: 2022-10-25

## 2022-10-25 NOTE — TELEPHONE ENCOUNTER
Called patient to schedule colonoscopy. Patient very hard of hearing. States I will have to call his daughter because she handle his appointments, Talked to his daughter and she states she dont drive due to being legally blind, states she will talk to her  to see what is a good date to schedule Mr Lexi Wilder, She will call me back.

## 2023-03-16 ENCOUNTER — APPOINTMENT (OUTPATIENT)
Dept: GENERAL RADIOLOGY | Age: 83
End: 2023-03-16
Attending: EMERGENCY MEDICINE
Payer: MEDICAID

## 2023-03-16 ENCOUNTER — APPOINTMENT (OUTPATIENT)
Dept: CT IMAGING | Age: 83
End: 2023-03-16
Attending: EMERGENCY MEDICINE
Payer: MEDICAID

## 2023-03-16 ENCOUNTER — HOSPITAL ENCOUNTER (EMERGENCY)
Age: 83
Discharge: HOME | End: 2023-03-16
Attending: EMERGENCY MEDICINE
Payer: MEDICAID

## 2023-03-16 VITALS
TEMPERATURE: 97.7 F | BODY MASS INDEX: 28.7 KG/M2 | SYSTOLIC BLOOD PRESSURE: 158 MMHG | OXYGEN SATURATION: 100 % | HEIGHT: 71 IN | DIASTOLIC BLOOD PRESSURE: 78 MMHG | WEIGHT: 205 LBS | RESPIRATION RATE: 18 BRPM | HEART RATE: 88 BPM

## 2023-03-16 DIAGNOSIS — M75.01 ADHESIVE CAPSULITIS OF RIGHT SHOULDER: ICD-10-CM

## 2023-03-16 DIAGNOSIS — M48.02 CERVICAL STENOSIS OF SPINE: Primary | ICD-10-CM

## 2023-03-16 PROCEDURE — 99284 EMERGENCY DEPT VISIT MOD MDM: CPT

## 2023-03-16 PROCEDURE — 73030 X-RAY EXAM OF SHOULDER: CPT

## 2023-03-16 PROCEDURE — 74011250637 HC RX REV CODE- 250/637: Performed by: EMERGENCY MEDICINE

## 2023-03-16 PROCEDURE — 74011250636 HC RX REV CODE- 250/636: Performed by: EMERGENCY MEDICINE

## 2023-03-16 PROCEDURE — 72125 CT NECK SPINE W/O DYE: CPT

## 2023-03-16 PROCEDURE — 96372 THER/PROPH/DIAG INJ SC/IM: CPT

## 2023-03-16 RX ORDER — METHYLPREDNISOLONE ACETATE 40 MG/ML
40 INJECTION, SUSPENSION INTRA-ARTICULAR; INTRALESIONAL; INTRAMUSCULAR; SOFT TISSUE ONCE
Status: COMPLETED | OUTPATIENT
Start: 2023-03-16 | End: 2023-03-16

## 2023-03-16 RX ORDER — IBUPROFEN 600 MG/1
600 TABLET ORAL
Qty: 20 TABLET | Refills: 0 | Status: SHIPPED | OUTPATIENT
Start: 2023-03-16

## 2023-03-16 RX ORDER — IBUPROFEN 600 MG/1
600 TABLET ORAL
Status: COMPLETED | OUTPATIENT
Start: 2023-03-16 | End: 2023-03-16

## 2023-03-16 RX ADMIN — METHYLPREDNISOLONE ACETATE 40 MG: 40 INJECTION, SUSPENSION INTRA-ARTICULAR; INTRALESIONAL; INTRAMUSCULAR; INTRASYNOVIAL; SOFT TISSUE at 13:07

## 2023-03-16 RX ADMIN — IBUPROFEN 600 MG: 600 TABLET, FILM COATED ORAL at 11:39

## 2023-03-16 NOTE — ED PROVIDER NOTES
HPI 64-year-old male with a history of hypertension previous CVA presents the ED complaining of neck pain diffuse with decreased range of motion over the past several weeks at least 2 to 3 weeks. Also with right shoulder discomfort and decreased range of motion now for at least 2 weeks. Denies trauma recent or remote no fever no weakness or numbness to the upper extremities. Poor hearing history and exam somewhat limited by age and loss of hearing    Past Medical History:   Diagnosis Date    GERD (gastroesophageal reflux disease)     Hypertension     Stroke ([facility] [address]        No past surgical history on file. Family History:   Family history unknown: Yes       Social History     Socioeconomic History    Marital status:      Spouse name: Not on file    Number of children: Not on file    Years of education: Not on file    Highest education level: Not on file   Occupational History    Not on file   Tobacco Use    Smoking status: Never    Smokeless tobacco: Never   Substance and Sexual Activity    Alcohol use: Never    Drug use: Never    Sexual activity: Not on file   Other Topics Concern    Not on file   Social History Narrative    Not on file     Social Determinants of Health     Financial Resource Strain: Not on file   Food Insecurity: Not on file   Transportation Needs: Not on file   Physical Activity: Not on file   Stress: Not on file   Social Connections: Not on file   Intimate Partner Violence: Not on file   Housing Stability: Not on file         ALLERGIES: Patient has no known allergies. Review of Systems   Constitutional:  Negative for appetite change, chills, diaphoresis and fever. HENT:  Positive for hearing loss. Negative for ear pain, facial swelling, sinus pain and trouble swallowing. Eyes:  Negative for redness and visual disturbance. Respiratory:  Negative for cough, choking, chest tightness, shortness of breath, wheezing and stridor.     Cardiovascular:  Negative for chest pain, palpitations and leg swelling. Gastrointestinal:  Negative for abdominal distention, abdominal pain, blood in stool, diarrhea, nausea and vomiting. Endocrine: Negative for polydipsia and polyuria. Genitourinary:  Negative for difficulty urinating, dysuria, flank pain, frequency, hematuria and urgency. Musculoskeletal:  Positive for arthralgias, neck pain and neck stiffness. Negative for back pain, joint swelling and myalgias. Allergic/Immunologic: Negative. Neurological: Negative. Negative for syncope, speech difficulty, weakness and numbness. Psychiatric/Behavioral: Negative. Vitals:    03/16/23 1109   BP: (!) 164/84   Pulse: 91   Resp: 19   Temp: 97.7 °F (36.5 °C)   SpO2: 98%   Weight: 93 kg (205 lb)   Height: 5' 11\" (1.803 m)          Pleasant elderly AA male no acute distress  Physical Exam  Vitals and nursing note reviewed. Constitutional:       General: He is not in acute distress. Appearance: He is not ill-appearing, toxic-appearing or diaphoretic. HENT:      Head: Normocephalic and atraumatic. Left Ear: Tympanic membrane normal.      Nose: Nose normal.      Mouth/Throat:      Mouth: Mucous membranes are moist.   Eyes:      Extraocular Movements: Extraocular movements intact. Conjunctiva/sclera: Conjunctivae normal.      Pupils: Pupils are equal, round, and reactive to light. Neck:      Comments: Marked diminishment of range of motion and flexion extension rotation and abduction there is no spinous process tenderness there is mild tenderness to the trapezius bilaterally right greater than left; thyroid grossly normal no soft tissue abnormality noted. Cardiovascular:      Rate and Rhythm: Normal rate and regular rhythm. Pulses: Normal pulses. Heart sounds: Normal heart sounds. No murmur heard. Pulmonary:      Effort: Pulmonary effort is normal. No respiratory distress. Breath sounds: Normal breath sounds. No wheezing, rhonchi or rales.    Abdominal: General: Bowel sounds are normal. There is no distension. Palpations: Abdomen is soft. There is no mass. Tenderness: There is no abdominal tenderness. There is no right CVA tenderness, left CVA tenderness, guarding or rebound. Hernia: No hernia is present. Musculoskeletal:         General: Tenderness present. No swelling, deformity or signs of injury. Cervical back: Neck supple. No rigidity or tenderness. Right lower leg: No edema. Left lower leg: No edema. Comments: The right shoulder has a markedly diminished range of motion patient able to extend only to approximately 90 degrees abduction to approximately 60 degrees internal rotation creates pain and stiffness there is mild tenderness in the [facility] joint and anterior shoulder no scapular tenderness; crepitus is palpable with movement though no joint effusion or active inflammation   Lymphadenopathy:      Cervical: No cervical adenopathy. Skin:     General: Skin is warm and dry. Capillary Refill: Capillary refill takes less than 2 seconds. Findings: No erythema, lesion or rash. Neurological:      General: No focal deficit present. Mental Status: He is alert and oriented to person, place, and time. Mental status is at baseline. Cranial Nerves: No cranial nerve deficit. Sensory: No sensory deficit. Motor: No weakness. Comments: There is no motor or sensory deficit in the upper extremities good pulses bilaterally   Psychiatric:         Mood and Affect: Mood normal.         Behavior: Behavior normal.         Thought Content: Thought content normal.        Medical Decision Making  Amount and/or Complexity of Data Reviewed  Radiology: ordered. Risk  Prescription drug management. CT of the cervical spine shows moderate to severe cervical stenosis at multiple levels.   No evidence of motor weakness by exam at this point  Probably will need MRI of the neck and referral to orthopedics to manage or improve the problem.   Suggested patient follow-up with Dr. Igor Zhang for MRI and referral-we will give contact info for [facility] orthopedics    Right shoulder showing some signs of capsulitis with diminished range of motion and normal x-ray this may improve with physical therapy-again will need to be ordered by Dr. Igor Zhang    We will give IM shot of Depo-Medrol here to hopefully reduce inflammation    Procedures

## 2023-03-16 NOTE — ED TRIAGE NOTES
Pt reports bilateral diffuse arm pain and neck stiffness x 2 weeks. Denies injury or trauma. No obvious deformity.

## 2023-08-23 ENCOUNTER — HOSPITAL ENCOUNTER (EMERGENCY)
Facility: HOSPITAL | Age: 83
Discharge: HOME | End: 2023-08-23
Attending: EMERGENCY MEDICINE
Payer: COMMERCIAL

## 2023-08-23 VITALS
HEART RATE: 70 BPM | OXYGEN SATURATION: 99 % | RESPIRATION RATE: 18 BRPM | WEIGHT: 196 LBS | DIASTOLIC BLOOD PRESSURE: 72 MMHG | HEIGHT: 71 IN | SYSTOLIC BLOOD PRESSURE: 130 MMHG | TEMPERATURE: 97.8 F | BODY MASS INDEX: 27.44 KG/M2
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
## 2023-08-23 DIAGNOSIS — S29.012A STRAIN OF THORACIC BACK REGION: ICD-10-CM

## 2023-08-23 DIAGNOSIS — S16.1XXA STRAIN OF NECK MUSCLE, INITIAL ENCOUNTER: Primary | ICD-10-CM

## 2023-08-23 PROCEDURE — 99283 EMERGENCY DEPT VISIT LOW MDM: CPT

## 2023-08-23 RX ORDER — METHOCARBAMOL 750 MG/1
750 TABLET, FILM COATED ORAL 2 TIMES DAILY PRN
Qty: 10 TABLET | Refills: 0 | Status: SHIPPED | OUTPATIENT
Start: 2023-08-23 | End: 2023-08-28

## 2023-08-23 ASSESSMENT — PAIN - FUNCTIONAL ASSESSMENT: PAIN_FUNCTIONAL_ASSESSMENT: 0-10

## 2023-08-23 ASSESSMENT — PAIN DESCRIPTION - LOCATION: LOCATION: BACK;NECK

## 2023-08-23 ASSESSMENT — PAIN SCALES - GENERAL: PAINLEVEL_OUTOF10: 5
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
## 2024-01-23 ENCOUNTER — APPOINTMENT (OUTPATIENT)
Facility: HOSPITAL | Age: 84
End: 2024-01-23
Payer: MEDICAID

## 2024-01-23 ENCOUNTER — HOSPITAL ENCOUNTER (EMERGENCY)
Facility: HOSPITAL | Age: 84
Discharge: HOME | End: 2024-01-23
Attending: EMERGENCY MEDICINE
Payer: MEDICAID

## 2024-01-23 VITALS
HEART RATE: 98 BPM | TEMPERATURE: 97.8 F | HEIGHT: 71 IN | WEIGHT: 196 LBS | RESPIRATION RATE: 17 BRPM | OXYGEN SATURATION: 97 % | BODY MASS INDEX: 27.44 KG/M2 | DIASTOLIC BLOOD PRESSURE: 83 MMHG | SYSTOLIC BLOOD PRESSURE: 131 MMHG

## 2024-01-23 DIAGNOSIS — M79.10 MYALGIA: Primary | ICD-10-CM

## 2024-01-23 LAB
ALBUMIN SERPL-MCNC: 2.6 G/DL (ref 3.5–5)
ALBUMIN/GLOB SERPL: 0.5 (ref 1.1–2.2)
ALP SERPL-CCNC: 96 U/L (ref 45–117)
ALT SERPL-CCNC: <6 U/L (ref 12–78)
ANION GAP SERPL CALC-SCNC: 11 MMOL/L (ref 5–15)
AST SERPL W P-5'-P-CCNC: 16 U/L (ref 15–37)
BASOPHILS # BLD: 0.1 K/UL (ref 0–0.1)
BASOPHILS NFR BLD: 1 % (ref 0–1)
BILIRUB SERPL-MCNC: 0.6 MG/DL (ref 0.2–1)
BUN SERPL-MCNC: 8 MG/DL (ref 6–20)
BUN/CREAT SERPL: 9 (ref 12–20)
CA-I BLD-MCNC: 8.8 MG/DL (ref 8.5–10.1)
CHLORIDE SERPL-SCNC: 102 MMOL/L (ref 97–108)
CO2 SERPL-SCNC: 25 MMOL/L (ref 21–32)
CREAT SERPL-MCNC: 0.85 MG/DL (ref 0.7–1.3)
DIFFERENTIAL METHOD BLD: ABNORMAL
EOSINOPHIL # BLD: 0.3 K/UL (ref 0–0.4)
EOSINOPHIL NFR BLD: 3 % (ref 0–7)
ERYTHROCYTE [DISTWIDTH] IN BLOOD BY AUTOMATED COUNT: 13.9 % (ref 11.5–14.5)
FLUAV RNA SPEC QL NAA+PROBE: NOT DETECTED
FLUBV RNA SPEC QL NAA+PROBE: NOT DETECTED
GLOBULIN SER CALC-MCNC: 5.6 G/DL (ref 2–4)
GLUCOSE SERPL-MCNC: 91 MG/DL (ref 65–100)
HCT VFR BLD AUTO: 31.7 % (ref 36.6–50.3)
HGB BLD-MCNC: 10 G/DL (ref 12.1–17)
IMM GRANULOCYTES # BLD AUTO: 0.1 K/UL (ref 0–0.04)
IMM GRANULOCYTES NFR BLD AUTO: 1 % (ref 0–0.5)
LYMPHOCYTES # BLD: 1.4 K/UL (ref 0.8–3.5)
LYMPHOCYTES NFR BLD: 15 % (ref 12–49)
MCH RBC QN AUTO: 29 PG (ref 26–34)
MCHC RBC AUTO-ENTMCNC: 31.5 G/DL (ref 30–36.5)
MCV RBC AUTO: 91.9 FL (ref 80–99)
MONOCYTES # BLD: 0.8 K/UL (ref 0–1)
MONOCYTES NFR BLD: 9 % (ref 5–13)
NEUTS SEG # BLD: 6.7 K/UL (ref 1.8–8)
NEUTS SEG NFR BLD: 71 % (ref 32–75)
NRBC # BLD: 0 K/UL (ref 0–0.01)
NRBC BLD-RTO: 0 PER 100 WBC
PLATELET # BLD AUTO: 339 K/UL (ref 150–400)
PLATELET COMMENT: ABNORMAL
PMV BLD AUTO: 11.1 FL (ref 8.9–12.9)
POTASSIUM SERPL-SCNC: 4.2 MMOL/L (ref 3.5–5.1)
PROT SERPL-MCNC: 8.2 G/DL (ref 6.4–8.2)
RBC # BLD AUTO: 3.45 M/UL (ref 4.1–5.7)
RBC MORPH BLD: ABNORMAL
SARS-COV-2 RNA RESP QL NAA+PROBE: NOT DETECTED
SODIUM SERPL-SCNC: 138 MMOL/L (ref 136–145)
TROPONIN I SERPL HS-MCNC: 5 NG/L (ref 0–76)
WBC # BLD AUTO: 9.4 K/UL (ref 4.1–11.1)

## 2024-01-23 PROCEDURE — 87636 SARSCOV2 & INF A&B AMP PRB: CPT

## 2024-01-23 PROCEDURE — 36415 COLL VENOUS BLD VENIPUNCTURE: CPT

## 2024-01-23 PROCEDURE — 99284 EMERGENCY DEPT VISIT MOD MDM: CPT

## 2024-01-23 PROCEDURE — 84484 ASSAY OF TROPONIN QUANT: CPT

## 2024-01-23 PROCEDURE — 71045 X-RAY EXAM CHEST 1 VIEW: CPT

## 2024-01-23 PROCEDURE — 2580000003 HC RX 258: Performed by: EMERGENCY MEDICINE

## 2024-01-23 PROCEDURE — 85025 COMPLETE CBC W/AUTO DIFF WBC: CPT

## 2024-01-23 PROCEDURE — 6370000000 HC RX 637 (ALT 250 FOR IP): Performed by: EMERGENCY MEDICINE

## 2024-01-23 PROCEDURE — 80053 COMPREHEN METABOLIC PANEL: CPT

## 2024-01-23 PROCEDURE — 96360 HYDRATION IV INFUSION INIT: CPT

## 2024-01-23 RX ORDER — SODIUM CHLORIDE, SODIUM LACTATE, POTASSIUM CHLORIDE, AND CALCIUM CHLORIDE .6; .31; .03; .02 G/100ML; G/100ML; G/100ML; G/100ML
500 INJECTION, SOLUTION INTRAVENOUS ONCE
Status: COMPLETED | OUTPATIENT
Start: 2024-01-23 | End: 2024-01-23

## 2024-01-23 RX ORDER — IBUPROFEN 600 MG/1
600 TABLET ORAL
Status: COMPLETED | OUTPATIENT
Start: 2024-01-23 | End: 2024-01-23

## 2024-01-23 RX ORDER — ACETAMINOPHEN 500 MG
1000 TABLET ORAL
Status: COMPLETED | OUTPATIENT
Start: 2024-01-23 | End: 2024-01-23

## 2024-01-23 RX ADMIN — ACETAMINOPHEN 1000 MG: 500 TABLET ORAL at 12:56

## 2024-01-23 RX ADMIN — IBUPROFEN 600 MG: 600 TABLET, FILM COATED ORAL at 12:56

## 2024-01-23 RX ADMIN — SODIUM CHLORIDE, POTASSIUM CHLORIDE, SODIUM LACTATE AND CALCIUM CHLORIDE 500 ML: 600; 310; 30; 20 INJECTION, SOLUTION INTRAVENOUS at 12:56

## 2024-01-23 ASSESSMENT — PAIN DESCRIPTION - PAIN TYPE: TYPE: CHRONIC PAIN;ACUTE PAIN

## 2024-01-23 ASSESSMENT — PAIN SCALES - GENERAL: PAINLEVEL_OUTOF10: 10

## 2024-01-23 ASSESSMENT — PAIN DESCRIPTION - LOCATION: LOCATION: GENERALIZED

## 2024-01-23 ASSESSMENT — PAIN - FUNCTIONAL ASSESSMENT: PAIN_FUNCTIONAL_ASSESSMENT: 0-10

## 2024-01-23 NOTE — ED PROVIDER NOTES
(gastroesophageal reflux disease)     Hypertension     Stroke (HCC)        Past Surgical History:  History reviewed. No pertinent surgical history.    Family History:  History reviewed. No pertinent family history.    Social History:  Social History     Tobacco Use    Smoking status: Never    Smokeless tobacco: Never   Substance Use Topics    Alcohol use: Never    Drug use: Never       Allergies:  No Known Allergies      Review of Systems   Constitutional: Negative except as in HPI.  Eyes: Negative except as in HPI.  ENT: Negative except as in HPI.  Cardiovascular: Negative except as in HPI.  Respiratory: Negative except as in HPI.  Gastrointestinal: Negative except as in HPI.  Genitourinary: Negative except as in HPI.  Musculoskeletal: Negative except as in HPI.  Integumentary: Negative except as in HPI.  Neurological: Negative except as in HPI.  Psychiatric: Negative except as in HPI.  Endocrine: Negative except as in HPI.  Hematologic/Lymphatic: Negative except as in HPI.  Allergic/Immunologic: Negative except as in HPI.    Physical Exam   Constitutional: Awake and alert, interactive, NAD  Eyes: PERRL, no injection or scleral icterus, no discharge  HEENT: NCAT, neck supple, MMM, no oropharyngeal exudates  CV: Slightly tachycardic, RR, no m/r/g  Respiratory: CTAB, no r/r/w  GI: Abd soft, nondistended, nontender  : Deferred  MSK: FROM, no joint effusions or edema  Skin: No rashes  Neuro: CN2-12 intact, symmetric facies, fluent speech.  Psych: Well-groomed, normal speech, behavior, appropriate mood    Lab and Diagnostic Study Results     Labs -   No results found for this or any previous visit (from the past 12 hour(s)).    Radiologic Studies -   [unfilled]  [unfilled]  [unfilled]    Medical Decision Making and ED Course   - I am the first and primary provider for this patient AND AM THE PRIMARY PROVIDER OF RECORD.    - I reviewed the vital signs, available nursing notes, past medical history, past surgical history,

## 2024-01-23 NOTE — DISCHARGE INSTRUCTIONS
the Emergency Department. We are available 24 hours a day.     If a prescription has been provided, please have it filled as soon as possible to prevent a delay in treatment. If you have any questions or reservations about taking the medication due to side effects or interactions with other medications, please call your primary care provider or contact the ER.

## 2024-01-23 NOTE — ED TRIAGE NOTES
Pt reports cough x 2 weeks and body aches. Pt also reports lower back pain and loss of voice. PT reports he saw his PCP and was diagnosed with arthritis and a cold.

## 2024-01-23 NOTE — ED NOTES
Pt given discharge and follow up instructions. Pt shows understanding. Advised to follow with PCP. No further questions at this time.

## 2024-07-03 ENCOUNTER — TRANSCRIBE ORDERS (OUTPATIENT)
Facility: HOSPITAL | Age: 84
End: 2024-07-03

## 2024-07-03 DIAGNOSIS — M79.89 PAIN AND SWELLING OF LEFT LOWER LEG: ICD-10-CM

## 2024-07-03 DIAGNOSIS — M79.662 PAIN AND SWELLING OF LEFT LOWER LEG: ICD-10-CM

## 2024-07-03 DIAGNOSIS — M79.605 LEFT LEG PAIN: Primary | ICD-10-CM

## 2024-07-05 ENCOUNTER — HOSPITAL ENCOUNTER (OUTPATIENT)
Facility: HOSPITAL | Age: 84
End: 2024-07-05
Payer: MEDICARE

## 2024-07-05 DIAGNOSIS — M79.605 LEFT LEG PAIN: ICD-10-CM

## 2024-07-05 DIAGNOSIS — M79.662 PAIN AND SWELLING OF LEFT LOWER LEG: ICD-10-CM

## 2024-07-05 DIAGNOSIS — M79.89 PAIN AND SWELLING OF LEFT LOWER LEG: ICD-10-CM

## 2024-07-05 PROCEDURE — 93971 EXTREMITY STUDY: CPT

## 2024-07-09 ENCOUNTER — HOSPITAL ENCOUNTER (EMERGENCY)
Facility: HOSPITAL | Age: 84
Discharge: HOME | End: 2024-07-09
Attending: EMERGENCY MEDICINE
Payer: MEDICARE

## 2024-07-09 VITALS
HEART RATE: 88 BPM | SYSTOLIC BLOOD PRESSURE: 124 MMHG | OXYGEN SATURATION: 95 % | TEMPERATURE: 97.9 F | WEIGHT: 183.9 LBS | BODY MASS INDEX: 25.75 KG/M2 | RESPIRATION RATE: 18 BRPM | DIASTOLIC BLOOD PRESSURE: 75 MMHG | HEIGHT: 71 IN

## 2024-07-09 DIAGNOSIS — M17.12 PRIMARY OSTEOARTHRITIS OF LEFT KNEE: Primary | ICD-10-CM

## 2024-07-09 DIAGNOSIS — R60.0 EDEMA, PERIPHERAL: ICD-10-CM

## 2024-07-09 LAB
ALBUMIN SERPL-MCNC: 3.1 G/DL (ref 3.5–5)
ALBUMIN/GLOB SERPL: 0.7 (ref 1.1–2.2)
ALP SERPL-CCNC: 105 U/L (ref 45–117)
ALT SERPL-CCNC: 11 U/L (ref 12–78)
ANION GAP SERPL CALC-SCNC: 8 MMOL/L (ref 5–15)
AST SERPL W P-5'-P-CCNC: 10 U/L (ref 15–37)
BASOPHILS # BLD: 0 K/UL (ref 0–0.1)
BASOPHILS NFR BLD: 1 % (ref 0–1)
BILIRUB SERPL-MCNC: 0.6 MG/DL (ref 0.2–1)
BNP SERPL-MCNC: 38 PG/ML
BUN SERPL-MCNC: 17 MG/DL (ref 6–20)
BUN/CREAT SERPL: 17 (ref 12–20)
CA-I BLD-MCNC: 9 MG/DL (ref 8.5–10.1)
CHLORIDE SERPL-SCNC: 107 MMOL/L (ref 97–108)
CO2 SERPL-SCNC: 26 MMOL/L (ref 21–32)
CREAT SERPL-MCNC: 1.01 MG/DL (ref 0.7–1.3)
DIFFERENTIAL METHOD BLD: ABNORMAL
EOSINOPHIL # BLD: 0.5 K/UL (ref 0–0.4)
EOSINOPHIL NFR BLD: 6 % (ref 0–7)
ERYTHROCYTE [DISTWIDTH] IN BLOOD BY AUTOMATED COUNT: 12.7 % (ref 11.5–14.5)
GLOBULIN SER CALC-MCNC: 4.2 G/DL (ref 2–4)
GLUCOSE SERPL-MCNC: 89 MG/DL (ref 65–100)
HCT VFR BLD AUTO: 37.1 % (ref 36.6–50.3)
HGB BLD-MCNC: 12 G/DL (ref 12.1–17)
IMM GRANULOCYTES # BLD AUTO: 0 K/UL (ref 0–0.04)
IMM GRANULOCYTES NFR BLD AUTO: 0 % (ref 0–0.5)
LYMPHOCYTES # BLD: 1.7 K/UL (ref 0.8–3.5)
LYMPHOCYTES NFR BLD: 21 % (ref 12–49)
MCH RBC QN AUTO: 30.7 PG (ref 26–34)
MCHC RBC AUTO-ENTMCNC: 32.3 G/DL (ref 30–36.5)
MCV RBC AUTO: 94.9 FL (ref 80–99)
MONOCYTES # BLD: 0.6 K/UL (ref 0–1)
MONOCYTES NFR BLD: 7 % (ref 5–13)
NEUTS SEG # BLD: 5.2 K/UL (ref 1.8–8)
NEUTS SEG NFR BLD: 65 % (ref 32–75)
NRBC # BLD: 0 K/UL (ref 0–0.01)
NRBC BLD-RTO: 0 PER 100 WBC
PLATELET # BLD AUTO: 334 K/UL (ref 150–400)
PMV BLD AUTO: 9.9 FL (ref 8.9–12.9)
POTASSIUM SERPL-SCNC: 4 MMOL/L (ref 3.5–5.1)
PROT SERPL-MCNC: 7.3 G/DL (ref 6.4–8.2)
RBC # BLD AUTO: 3.91 M/UL (ref 4.1–5.7)
SODIUM SERPL-SCNC: 141 MMOL/L (ref 136–145)
TROPONIN I SERPL HS-MCNC: 5 NG/L (ref 0–76)
WBC # BLD AUTO: 8 K/UL (ref 4.1–11.1)

## 2024-07-09 PROCEDURE — 84484 ASSAY OF TROPONIN QUANT: CPT

## 2024-07-09 PROCEDURE — 93005 ELECTROCARDIOGRAM TRACING: CPT | Performed by: EMERGENCY MEDICINE

## 2024-07-09 PROCEDURE — 85025 COMPLETE CBC W/AUTO DIFF WBC: CPT

## 2024-07-09 PROCEDURE — 83880 ASSAY OF NATRIURETIC PEPTIDE: CPT

## 2024-07-09 PROCEDURE — 36415 COLL VENOUS BLD VENIPUNCTURE: CPT

## 2024-07-09 PROCEDURE — 80053 COMPREHEN METABOLIC PANEL: CPT

## 2024-07-09 PROCEDURE — 6370000000 HC RX 637 (ALT 250 FOR IP): Performed by: EMERGENCY MEDICINE

## 2024-07-09 PROCEDURE — 99284 EMERGENCY DEPT VISIT MOD MDM: CPT

## 2024-07-09 RX ORDER — ACETAMINOPHEN 325 MG/1
650 TABLET ORAL
Status: COMPLETED | OUTPATIENT
Start: 2024-07-09 | End: 2024-07-09

## 2024-07-09 RX ADMIN — ACETAMINOPHEN 650 MG: 325 TABLET ORAL at 21:40

## 2024-07-09 ASSESSMENT — PAIN - FUNCTIONAL ASSESSMENT
PAIN_FUNCTIONAL_ASSESSMENT: 0-10
PAIN_FUNCTIONAL_ASSESSMENT: PREVENTS OR INTERFERES WITH MANY ACTIVE NOT PASSIVE ACTIVITIES

## 2024-07-09 ASSESSMENT — PAIN DESCRIPTION - LOCATION: LOCATION: LEG

## 2024-07-09 ASSESSMENT — PAIN SCALES - GENERAL: PAINLEVEL_OUTOF10: 10

## 2024-07-09 ASSESSMENT — PAIN DESCRIPTION - PAIN TYPE: TYPE: CHRONIC PAIN

## 2024-07-10 LAB
EKG ATRIAL RATE: 87 BPM
EKG DIAGNOSIS: NORMAL
EKG P AXIS: 72 DEGREES
EKG P-R INTERVAL: 189 MS
EKG Q-T INTERVAL: 387 MS
EKG QRS DURATION: 125 MS
EKG QTC CALCULATION (BAZETT): 466 MS
EKG R AXIS: 58 DEGREES
EKG T AXIS: 53 DEGREES
EKG VENTRICULAR RATE: 87 BPM

## 2024-07-10 NOTE — ED NOTES
Discharge instructions reviewed and patient verbalized understanding. Patient assisted to POV by wheelchair at discharge.  
    SOCIAL HISTORY       Social History     Socioeconomic History    Marital status:      Spouse name: None    Number of children: None    Years of education: None    Highest education level: None   Tobacco Use    Smoking status: Never    Smokeless tobacco: Never   Substance and Sexual Activity    Alcohol use: Never    Drug use: Never       SCREENINGS         Mc[place]ain Coma Scale  Eye Opening: Spontaneous  Best Verbal Response: Oriented  Best Motor Response: Obeys commands  McLain Coma Scale Score: 15                     CIWA Assessment  BP: 124/75  Pulse: 88                 PHYSICAL EXAM       ED Triage Vitals [07/09/24 2104]   BP Temp Temp Source Pulse Respirations SpO2 Height Weight - Scale   124/75 97.9 °F (36.6 °C) Oral 88 18 95 % 1.803 m (5' 11\") 83.4 kg (183 lb 14.4 oz)       Physical Exam  Vitals and nursing note reviewed.   Constitutional:       General: He is not in acute distress.     Appearance: Normal appearance.   HENT:      Head: Normocephalic and atraumatic.      Mouth/Throat:      Pharynx: Oropharynx is clear.   Eyes:      General: No scleral icterus.     Extraocular Movements: Extraocular movements intact.      Conjunctiva/sclera: Conjunctivae normal.      Pupils: Pupils are equal, round, and reactive to light.   Cardiovascular:      Rate and Rhythm: Normal rate and regular rhythm.      Pulses: Normal pulses.      Heart sounds: Normal heart sounds.   Pulmonary:      Effort: Pulmonary effort is normal.      Breath sounds: Normal breath sounds.   Musculoskeletal:         General: Tenderness present.      Right knee: No swelling.      Left knee: Swelling and bony tenderness present. Decreased range of motion. Normal pulse.      Right lower leg: No edema.      Left lower leg: Edema present.        Legs:       Comments: 2+ edema, knee down.   Skin:     General: Skin is warm.      Capillary Refill: Capillary refill takes less than 2 seconds.   Neurological:      General: No focal deficit present.

## 2024-07-10 NOTE — ED TRIAGE NOTES
Patient reports pain and swelling to left leg x 3 days.  Swelling noted to bilateral LE.  Pt does report some shortness of breath.  No reports of chest pain.  Patient states he had a similar episode like this last month and had fluid removed from knee.

## 2024-07-15 ENCOUNTER — TRANSCRIBE ORDERS (OUTPATIENT)
Facility: HOSPITAL | Age: 84
End: 2024-07-15

## 2024-07-15 ENCOUNTER — HOSPITAL ENCOUNTER (OUTPATIENT)
Facility: HOSPITAL | Age: 84
Discharge: HOME | End: 2024-07-17

## 2024-07-15 DIAGNOSIS — R60.9 EDEMA, UNSPECIFIED TYPE: Primary | ICD-10-CM

## 2024-07-15 DIAGNOSIS — R60.9 EDEMA, UNSPECIFIED TYPE: ICD-10-CM

## (undated) DEVICE — GLIDESHEATH SLENDER ACCESS KIT: Brand: GLIDESHEATH SLENDER

## (undated) DEVICE — LULU RADIAL/FEMORAL ANGIOGRAPHY SHEET: Brand: CONVERTORS

## (undated) DEVICE — Device

## (undated) DEVICE — COPE MANDRIL WIRE GUIDE STAINLESS STEEL: Brand: COPE

## (undated) DEVICE — BAND COMPR L24CM REG CLR PLAS HEMSTAT EXT HK AND LOOP RETEN

## (undated) DEVICE — SURGICAL PROCEDURE TRAY CRD CATH 3 PRT

## (undated) DEVICE — GUIDEWIRE VASC L260CM DIA0.035IN TIP L3MM STD EXCHG PTFE J

## (undated) DEVICE — WASTEBAG DRIP/ADAPTER: Brand: MEDLINE INDUSTRIES, INC.

## (undated) DEVICE — CATHETER ANGIO 5FR L100CM GRY S STL NYL JR4 3 SEG BRAID L

## (undated) DEVICE — PERCUTANEOUS ENTRY THINWALL NEEDLE  ONE-PART: Brand: COOK

## (undated) DEVICE — SYRINGE MED 10ML PUR GAM COMPATIBLE POLYCARB FIX M LUER CONN

## (undated) DEVICE — CATHETER ANGIO 5FR L100CM GRY S STL NYL JL3.5 3 SEG BRAID L